# Patient Record
Sex: FEMALE | Race: WHITE | NOT HISPANIC OR LATINO | Employment: FULL TIME | URBAN - METROPOLITAN AREA
[De-identification: names, ages, dates, MRNs, and addresses within clinical notes are randomized per-mention and may not be internally consistent; named-entity substitution may affect disease eponyms.]

---

## 2017-09-05 ENCOUNTER — OFFICE VISIT (OUTPATIENT)
Dept: URGENT CARE | Facility: CLINIC | Age: 22
End: 2017-09-05
Payer: COMMERCIAL

## 2017-09-05 VITALS
RESPIRATION RATE: 16 BRPM | DIASTOLIC BLOOD PRESSURE: 76 MMHG | OXYGEN SATURATION: 98 % | SYSTOLIC BLOOD PRESSURE: 116 MMHG | HEIGHT: 62 IN | HEART RATE: 60 BPM | TEMPERATURE: 98 F | WEIGHT: 154 LBS | BODY MASS INDEX: 28.34 KG/M2

## 2017-09-05 DIAGNOSIS — J32.9 SINUSITIS, UNSPECIFIED CHRONICITY, UNSPECIFIED LOCATION: Primary | ICD-10-CM

## 2017-09-05 PROCEDURE — 96372 THER/PROPH/DIAG INJ SC/IM: CPT | Mod: S$GLB,,, | Performed by: EMERGENCY MEDICINE

## 2017-09-05 PROCEDURE — 99203 OFFICE O/P NEW LOW 30 MIN: CPT | Mod: 25,S$GLB,, | Performed by: EMERGENCY MEDICINE

## 2017-09-05 PROCEDURE — 3008F BODY MASS INDEX DOCD: CPT | Mod: S$GLB,,, | Performed by: EMERGENCY MEDICINE

## 2017-09-05 RX ORDER — BETAMETHASONE SODIUM PHOSPHATE AND BETAMETHASONE ACETATE 3; 3 MG/ML; MG/ML
9 INJECTION, SUSPENSION INTRA-ARTICULAR; INTRALESIONAL; INTRAMUSCULAR; SOFT TISSUE
Status: COMPLETED | OUTPATIENT
Start: 2017-09-05 | End: 2017-09-05

## 2017-09-05 RX ORDER — AZITHROMYCIN 500 MG/1
500 TABLET, FILM COATED ORAL DAILY
Qty: 5 TABLET | Refills: 0 | Status: SHIPPED | OUTPATIENT
Start: 2017-09-05 | End: 2017-09-05

## 2017-09-05 RX ORDER — AZITHROMYCIN 500 MG/1
500 TABLET, FILM COATED ORAL DAILY
Qty: 5 TABLET | Refills: 0 | Status: SHIPPED | OUTPATIENT
Start: 2017-09-05 | End: 2017-09-10

## 2017-09-05 RX ORDER — LISDEXAMFETAMINE DIMESYLATE 50 MG/1
50 CAPSULE ORAL EVERY MORNING
Refills: 0 | COMMUNITY
Start: 2017-06-02 | End: 2020-09-10

## 2017-09-05 RX ADMIN — BETAMETHASONE SODIUM PHOSPHATE AND BETAMETHASONE ACETATE 9 MG: 3; 3 INJECTION, SUSPENSION INTRA-ARTICULAR; INTRALESIONAL; INTRAMUSCULAR; SOFT TISSUE at 02:09

## 2017-09-05 NOTE — PROGRESS NOTES
"Subjective:       Patient ID: Noreen Olivas is a 22 y.o. female.    Vitals:  height is 5' 2" (1.575 m) and weight is 69.9 kg (154 lb). Her oral temperature is 98.3 °F (36.8 °C). Her blood pressure is 116/76 and her pulse is 60. Her respiration is 16 and oxygen saturation is 98%.     Chief Complaint: Cough (pt. started with a cough and congestion yesterday.)    Cough   This is a new problem. The current episode started in the past 7 days. The problem has been gradually worsening. The problem occurs constantly. The cough is non-productive. Associated symptoms include ear congestion, headaches, nasal congestion, postnasal drip and a sore throat. Pertinent negatives include no chest pain, chills, ear pain, eye redness, fever, myalgias, shortness of breath or wheezing. The symptoms are aggravated by lying down. There is no history of asthma or pneumonia.     Review of Systems   Constitution: Positive for malaise/fatigue. Negative for chills and fever.   HENT: Positive for congestion, postnasal drip and sore throat. Negative for ear pain and hoarse voice.    Eyes: Negative for discharge and redness.   Cardiovascular: Negative for chest pain, dyspnea on exertion and leg swelling.   Respiratory: Positive for cough. Negative for shortness of breath, sputum production and wheezing.    Musculoskeletal: Negative for myalgias.   Gastrointestinal: Negative for abdominal pain and nausea.   Neurological: Positive for headaches.       Objective:      Physical Exam   Constitutional: She is oriented to person, place, and time. She appears well-developed and well-nourished. She is cooperative.  Non-toxic appearance. She does not appear ill. No distress.   HENT:   Head: Normocephalic and atraumatic.   Right Ear: Hearing, tympanic membrane, external ear and ear canal normal.   Left Ear: Hearing, tympanic membrane, external ear and ear canal normal.   Nose: Mucosal edema present. No rhinorrhea or nasal deformity. No epistaxis. " Right sinus exhibits maxillary sinus tenderness. Right sinus exhibits no frontal sinus tenderness. Left sinus exhibits maxillary sinus tenderness. Left sinus exhibits no frontal sinus tenderness.   Mouth/Throat: Uvula is midline and mucous membranes are normal. No trismus in the jaw. Normal dentition. No uvula swelling. Posterior oropharyngeal erythema present. Tonsils are 2+ on the right. Tonsils are 2+ on the left.   Eyes: Conjunctivae and lids are normal. Right eye exhibits no discharge. Left eye exhibits no discharge. No scleral icterus.   Sclera clear bilat   Neck: Trachea normal, normal range of motion, full passive range of motion without pain and phonation normal. Neck supple.   Cardiovascular: Normal rate, regular rhythm, normal heart sounds, intact distal pulses and normal pulses.    Pulmonary/Chest: Effort normal and breath sounds normal. No respiratory distress.   Abdominal: Soft. Normal appearance. She exhibits no distension, no pulsatile midline mass and no mass. There is no tenderness.   Musculoskeletal: Normal range of motion. She exhibits no edema or deformity.   Neurological: She is alert and oriented to person, place, and time. She exhibits normal muscle tone. Coordination normal.   Skin: Skin is warm, dry and intact. She is not diaphoretic. No pallor.   Psychiatric: She has a normal mood and affect. Her speech is normal and behavior is normal. Judgment and thought content normal. Cognition and memory are normal.   Nursing note and vitals reviewed.      Assessment:       1. Sinusitis, unspecified chronicity, unspecified location        Plan:         Sinusitis, unspecified chronicity, unspecified location  -     betamethasone acetate-betamethasone sodium phosphate injection 9 mg; Inject 1.5 mLs (9 mg total) into the muscle one time.  -     azithromycin (ZITHROMAX) 500 MG tablet; Take 1 tablet (500 mg total) by mouth once daily.  Dispense: 5 tablet; Refill: 0

## 2017-09-08 ENCOUNTER — TELEPHONE (OUTPATIENT)
Dept: URGENT CARE | Facility: CLINIC | Age: 22
End: 2017-09-08

## 2019-06-20 ENCOUNTER — LAB VISIT (OUTPATIENT)
Dept: LAB | Facility: HOSPITAL | Age: 24
End: 2019-06-20
Attending: OBSTETRICS & GYNECOLOGY
Payer: COMMERCIAL

## 2019-06-20 ENCOUNTER — OFFICE VISIT (OUTPATIENT)
Dept: OBSTETRICS AND GYNECOLOGY | Facility: CLINIC | Age: 24
End: 2019-06-20
Payer: COMMERCIAL

## 2019-06-20 VITALS
WEIGHT: 144.94 LBS | SYSTOLIC BLOOD PRESSURE: 102 MMHG | DIASTOLIC BLOOD PRESSURE: 70 MMHG | BODY MASS INDEX: 26.67 KG/M2 | HEIGHT: 62 IN

## 2019-06-20 DIAGNOSIS — Z11.3 SCREENING FOR STD (SEXUALLY TRANSMITTED DISEASE): ICD-10-CM

## 2019-06-20 DIAGNOSIS — Z12.4 ENCOUNTER FOR PAPANICOLAOU SMEAR FOR CERVICAL CANCER SCREENING: ICD-10-CM

## 2019-06-20 DIAGNOSIS — Z01.419 ENCOUNTER FOR GYNECOLOGICAL EXAMINATION: Primary | ICD-10-CM

## 2019-06-20 LAB
HBV SURFACE AG SERPL QL IA: NEGATIVE
HIV 1+2 AB+HIV1 P24 AG SERPL QL IA: NEGATIVE

## 2019-06-20 PROCEDURE — 99385 PR PREVENTIVE VISIT,NEW,18-39: ICD-10-PCS | Mod: S$GLB,,, | Performed by: OBSTETRICS & GYNECOLOGY

## 2019-06-20 PROCEDURE — 86703 HIV-1/HIV-2 1 RESULT ANTBDY: CPT

## 2019-06-20 PROCEDURE — 88175 CYTOPATH C/V AUTO FLUID REDO: CPT

## 2019-06-20 PROCEDURE — 87491 CHLMYD TRACH DNA AMP PROBE: CPT

## 2019-06-20 PROCEDURE — 86592 SYPHILIS TEST NON-TREP QUAL: CPT

## 2019-06-20 PROCEDURE — 99999 PR PBB SHADOW E&M-EST. PATIENT-LVL III: CPT | Mod: PBBFAC,,, | Performed by: OBSTETRICS & GYNECOLOGY

## 2019-06-20 PROCEDURE — 87340 HEPATITIS B SURFACE AG IA: CPT

## 2019-06-20 PROCEDURE — 86696 HERPES SIMPLEX TYPE 2 TEST: CPT

## 2019-06-20 PROCEDURE — 99999 PR PBB SHADOW E&M-EST. PATIENT-LVL III: ICD-10-PCS | Mod: PBBFAC,,, | Performed by: OBSTETRICS & GYNECOLOGY

## 2019-06-20 PROCEDURE — 99385 PREV VISIT NEW AGE 18-39: CPT | Mod: S$GLB,,, | Performed by: OBSTETRICS & GYNECOLOGY

## 2019-06-20 RX ORDER — TEMAZEPAM 15 MG/1
15 CAPSULE ORAL DAILY
Refills: 2 | COMMUNITY
Start: 2019-05-21 | End: 2020-12-30

## 2019-06-20 RX ORDER — NORETHINDRONE ACETATE AND ETHINYL ESTRADIOL 1MG-20(21)
1 KIT ORAL DAILY
Qty: 84 TABLET | Refills: 3 | Status: SHIPPED | OUTPATIENT
Start: 2019-06-20 | End: 2019-10-24 | Stop reason: SDUPTHER

## 2019-06-20 NOTE — PROGRESS NOTES
Subjective:       Patient ID: Noreen Olivas is a 24 y.o. female.    Chief Complaint:  Well Woman (annual a year ago)      History of Present Illness  - here for annual. Regular periods. Sometimes has cramps. Needs something for birth control. Didn't like Nuvaring. Hasn't taken pills in years. Had difficulty remembering to take daily years ago but now feels like has a regular schedule and would be able to take at same time daily. Concerned about weight gain with ocps. Requests STD testing, including labs.  - received Gardasil.    Past Medical History:   Diagnosis Date    ADHD (attention deficit hyperactivity disorder)        History reviewed. No pertinent surgical history.      Current Outpatient Medications:     temazepam (RESTORIL) 15 mg Cap, Take 15 mg by mouth once daily., Disp: , Rfl: 2    VYVANSE 50 mg capsule, Take 50 mg by mouth every morning., Disp: , Rfl: 0    norethindrone-ethinyl estradiol (JUNEL FE 1/20) 1 mg-20 mcg (21)/75 mg (7) per tablet, Take 1 tablet by mouth once daily., Disp: 84 tablet, Rfl: 3    Review of patient's allergies indicates:  No Known Allergies    GYN & OB History  Patient's last menstrual period was 2019.   Date of Last Pap: No result found    OB History    Para Term  AB Living   0 0 0 0 0 0   SAB TAB Ectopic Multiple Live Births   0 0 0 0 0       Social History     Socioeconomic History    Marital status: Single     Spouse name: Not on file    Number of children: Not on file    Years of education: Not on file    Highest education level: Not on file   Occupational History    Not on file   Social Needs    Financial resource strain: Not on file    Food insecurity:     Worry: Not on file     Inability: Not on file    Transportation needs:     Medical: Not on file     Non-medical: Not on file   Tobacco Use    Smoking status: Never Smoker    Smokeless tobacco: Never Used   Substance and Sexual Activity    Alcohol use: Yes     Comment: socially   "   Drug use: Never    Sexual activity: Yes     Birth control/protection: None   Lifestyle    Physical activity:     Days per week: Not on file     Minutes per session: Not on file    Stress: Not on file   Relationships    Social connections:     Talks on phone: Not on file     Gets together: Not on file     Attends Buddhist service: Not on file     Active member of club or organization: Not on file     Attends meetings of clubs or organizations: Not on file     Relationship status: Not on file   Other Topics Concern    Not on file   Social History Narrative    Not on file       Family History   Problem Relation Age of Onset    Breast cancer Neg Hx     Colon cancer Neg Hx     Ovarian cancer Neg Hx        Review of Systems  Review of Systems   Respiratory: Negative for shortness of breath.    Cardiovascular: Negative for chest pain and palpitations.   Gastrointestinal: Negative for blood in stool, nausea and vomiting.   Genitourinary:        - see HPI   Skin: Negative for rash and wound.   Allergic/Immunologic: Negative for immunocompromised state.   Neurological: Negative for dizziness and syncope.   Hematological: Negative for adenopathy.   Psychiatric/Behavioral: Negative for behavioral problems.        Objective:     Vitals:    06/20/19 0840   BP: 102/70   Weight: 65.8 kg (144 lb 15.2 oz)   Height: 5' 2" (1.575 m)       Physical Exam:   Constitutional: She is oriented to person, place, and time. She appears well-developed and well-nourished.        Pulmonary/Chest: Right breast exhibits no mass, no nipple discharge, no skin change, no tenderness and no swelling. Left breast exhibits no mass, no nipple discharge, no skin change, no tenderness and no swelling. Breasts are symmetrical.        Abdominal: Soft. She exhibits no distension. There is no tenderness.     Genitourinary: Vagina normal and uterus normal. There is no tenderness or lesion on the right labia. There is no tenderness or lesion on the " left labia. Cervix is normal. Right adnexum displays no mass, no tenderness and no fullness. Left adnexum displays no mass, no tenderness and no fullness. No vaginal discharge found. Additional cervical findings: pap smear done          Musculoskeletal: Moves all extremeties.       Neurological: She is alert and oriented to person, place, and time.     Psychiatric: She has a normal mood and affect.        Assessment/ Plan:     Orders Placed This Encounter    C. trachomatis/N. gonorrhoeae by AMP DNA [facility]; Cervix    HIV 1/2 Ag/Ab (4th Gen)    RPR    Hepatitis B surface antigen    Herpes simplex type 1&2 IgG,Herpes titer    Liquid-based pap smear, screening    norethindrone-ethinyl estradiol (JUNEL FE 1/20) 1 mg-20 mcg (21)/75 mg (7) per tablet       Noreen was seen today for well woman.    Diagnoses and all orders for this visit:    Encounter for gynecological examination    Encounter for Papanicolaou smear for cervical cancer screening  -     Liquid-based pap smear, screening    Screening for STD (sexually transmitted disease)  -     C. trachomatis/N. gonorrhoeae by AMP DNA [facility]; Cervix  -     HIV 1/2 Ag/Ab (4th Gen); Future  -     RPR; Future  -     Hepatitis B surface antigen; Future  -     Herpes simplex type 1&2 IgG,Herpes titer; Future    Other orders  -     norethindrone-ethinyl estradiol (JUNEL FE 1/20) 1 mg-20 mcg (21)/75 mg (7) per tablet; Take 1 tablet by mouth once daily.    - discussed risks/benefits of low dose ocps for cycle and birth control. Reassured that with low dose ocps, significant weight gain should not be concern. Patient will let me know if she experiences adverse mood changes.  - requests HSV testing, too. Doesn't have a h/o cold sores.    Follow up in about 1 year (around 6/20/2020) for annual exam.

## 2019-06-21 LAB
C TRACH DNA SPEC QL NAA+PROBE: NOT DETECTED
HSV1 IGG SERPL QL IA: NEGATIVE
HSV2 IGG SERPL QL IA: NEGATIVE
N GONORRHOEA DNA SPEC QL NAA+PROBE: NOT DETECTED
RPR SER QL: NORMAL

## 2019-10-24 ENCOUNTER — TELEPHONE (OUTPATIENT)
Dept: OBSTETRICS AND GYNECOLOGY | Facility: CLINIC | Age: 24
End: 2019-10-24

## 2019-10-24 ENCOUNTER — OFFICE VISIT (OUTPATIENT)
Dept: OBSTETRICS AND GYNECOLOGY | Facility: CLINIC | Age: 24
End: 2019-10-24
Payer: COMMERCIAL

## 2019-10-24 ENCOUNTER — LAB VISIT (OUTPATIENT)
Dept: LAB | Facility: HOSPITAL | Age: 24
End: 2019-10-24
Attending: OBSTETRICS & GYNECOLOGY
Payer: COMMERCIAL

## 2019-10-24 VITALS
WEIGHT: 152.75 LBS | SYSTOLIC BLOOD PRESSURE: 120 MMHG | BODY MASS INDEX: 28.11 KG/M2 | HEIGHT: 62 IN | DIASTOLIC BLOOD PRESSURE: 72 MMHG

## 2019-10-24 DIAGNOSIS — Z11.3 SCREENING FOR STD (SEXUALLY TRANSMITTED DISEASE): Primary | ICD-10-CM

## 2019-10-24 DIAGNOSIS — R30.0 DYSURIA: ICD-10-CM

## 2019-10-24 DIAGNOSIS — Z11.3 SCREENING FOR STD (SEXUALLY TRANSMITTED DISEASE): ICD-10-CM

## 2019-10-24 DIAGNOSIS — N89.8 VAGINAL DISCHARGE: ICD-10-CM

## 2019-10-24 PROCEDURE — 86592 SYPHILIS TEST NON-TREP QUAL: CPT

## 2019-10-24 PROCEDURE — 87340 HEPATITIS B SURFACE AG IA: CPT

## 2019-10-24 PROCEDURE — 3008F PR BODY MASS INDEX (BMI) DOCUMENTED: ICD-10-PCS | Mod: CPTII,S$GLB,, | Performed by: OBSTETRICS & GYNECOLOGY

## 2019-10-24 PROCEDURE — 99999 PR PBB SHADOW E&M-EST. PATIENT-LVL III: CPT | Mod: PBBFAC,,, | Performed by: OBSTETRICS & GYNECOLOGY

## 2019-10-24 PROCEDURE — 87801 DETECT AGNT MULT DNA AMPLI: CPT

## 2019-10-24 PROCEDURE — 87491 CHLMYD TRACH DNA AMP PROBE: CPT | Mod: 59

## 2019-10-24 PROCEDURE — 87481 CANDIDA DNA AMP PROBE: CPT | Mod: 59

## 2019-10-24 PROCEDURE — 87088 URINE BACTERIA CULTURE: CPT

## 2019-10-24 PROCEDURE — 99213 PR OFFICE/OUTPT VISIT, EST, LEVL III, 20-29 MIN: ICD-10-PCS | Mod: S$GLB,,, | Performed by: OBSTETRICS & GYNECOLOGY

## 2019-10-24 PROCEDURE — 3008F BODY MASS INDEX DOCD: CPT | Mod: CPTII,S$GLB,, | Performed by: OBSTETRICS & GYNECOLOGY

## 2019-10-24 PROCEDURE — 86696 HERPES SIMPLEX TYPE 2 TEST: CPT

## 2019-10-24 PROCEDURE — 86703 HIV-1/HIV-2 1 RESULT ANTBDY: CPT

## 2019-10-24 PROCEDURE — 87147 CULTURE TYPE IMMUNOLOGIC: CPT

## 2019-10-24 PROCEDURE — 99213 OFFICE O/P EST LOW 20 MIN: CPT | Mod: S$GLB,,, | Performed by: OBSTETRICS & GYNECOLOGY

## 2019-10-24 PROCEDURE — 87086 URINE CULTURE/COLONY COUNT: CPT

## 2019-10-24 PROCEDURE — 99999 PR PBB SHADOW E&M-EST. PATIENT-LVL III: ICD-10-PCS | Mod: PBBFAC,,, | Performed by: OBSTETRICS & GYNECOLOGY

## 2019-10-24 RX ORDER — NITROFURANTOIN 25; 75 MG/1; MG/1
100 CAPSULE ORAL 2 TIMES DAILY
Qty: 10 CAPSULE | Refills: 0 | Status: SHIPPED | OUTPATIENT
Start: 2019-10-24 | End: 2020-03-05

## 2019-10-24 RX ORDER — NORETHINDRONE ACETATE AND ETHINYL ESTRADIOL 1MG-20(21)
1 KIT ORAL DAILY
Qty: 84 TABLET | Refills: 3 | Status: SHIPPED | OUTPATIENT
Start: 2019-10-24 | End: 2020-03-05

## 2019-10-24 NOTE — PROGRESS NOTES
Subjective:       Patient ID: Noreen Olivas is a 24 y.o. female.    Chief Complaint:  Vaginitis (vaginal  pain   dysuria wants to do std testing .)      History of Present Illness  - patient presents with UTI symptoms. First occurred a few days ago then resolved with AZO. Started again this am; took AZO today.   - c/o scant vaginal discharge.  - has not started ocps.  - has had multiple partners and unprotected sex. Requests STD labs and cultures.    Past Medical History:   Diagnosis Date    ADHD (attention deficit hyperactivity disorder)     Vaccine for human papilloma virus (HPV) types 6, 11, 16, and 18 administered , ,     3 of 3 received        History reviewed. No pertinent surgical history.      Current Outpatient Medications:     norethindrone-ethinyl estradiol (JUNEL FE 1/20) 1 mg-20 mcg (21)/75 mg (7) per tablet, Take 1 tablet by mouth once daily., Disp: 84 tablet, Rfl: 3    temazepam (RESTORIL) 15 mg Cap, Take 15 mg by mouth once daily., Disp: , Rfl: 2    VYVANSE 50 mg capsule, Take 50 mg by mouth every morning., Disp: , Rfl: 0    nitrofurantoin, macrocrystal-monohydrate, (MACROBID) 100 MG capsule, Take 1 capsule (100 mg total) by mouth 2 (two) times daily., Disp: 10 capsule, Rfl: 0    Review of patient's allergies indicates:  No Known Allergies    GYN & OB History  Patient's last menstrual period was 10/03/2019.   Date of Last Pap: 2019    OB History    Para Term  AB Living   0 0 0 0 0 0   SAB TAB Ectopic Multiple Live Births   0 0 0 0 0       Social History     Socioeconomic History    Marital status: Single     Spouse name: Not on file    Number of children: Not on file    Years of education: Not on file    Highest education level: Not on file   Occupational History    Not on file   Social Needs    Financial resource strain: Not on file    Food insecurity:     Worry: Not on file     Inability: Not on file    Transportation needs:     Medical: Not on  "file     Non-medical: Not on file   Tobacco Use    Smoking status: Never Smoker    Smokeless tobacco: Never Used   Substance and Sexual Activity    Alcohol use: Yes     Comment: socially     Drug use: Never    Sexual activity: Yes     Birth control/protection: None   Lifestyle    Physical activity:     Days per week: Not on file     Minutes per session: Not on file    Stress: Not on file   Relationships    Social connections:     Talks on phone: Not on file     Gets together: Not on file     Attends Anabaptist service: Not on file     Active member of club or organization: Not on file     Attends meetings of clubs or organizations: Not on file     Relationship status: Not on file   Other Topics Concern    Not on file   Social History Narrative    Not on file       Family History   Problem Relation Age of Onset    Breast cancer Neg Hx     Colon cancer Neg Hx     Ovarian cancer Neg Hx        Review of Systems  Review of Systems   Respiratory: Negative for shortness of breath.    Cardiovascular: Negative for chest pain and palpitations.   Gastrointestinal: Negative for blood in stool, nausea and vomiting.   Genitourinary:        - see HPI   Skin: Negative for rash and wound.   Allergic/Immunologic: Negative for immunocompromised state.   Neurological: Negative for dizziness and syncope.   Hematological: Negative for adenopathy.   Psychiatric/Behavioral: Negative for behavioral problems.        Objective:     Vitals:    10/24/19 1405   BP: 120/72   Weight: 69.3 kg (152 lb 12.5 oz)   Height: 5' 2" (1.575 m)       Physical Exam:   Constitutional: She appears well-developed and well-nourished. She is cooperative. No distress.             Abdominal: Soft. Normal appearance. There is no tenderness.     Genitourinary: Uterus normal. There is no rash, tenderness or lesion on the right labia. There is no rash, tenderness or lesion on the left labia. Cervix is normal. Right adnexum displays no mass, no tenderness and " no fullness. Left adnexum displays no mass, no tenderness and no fullness. Vaginal discharge found.   Genitourinary Comments: Scant yellow discharge in vault.               Neurological: She is alert.          Assessment/ Plan:     Orders Placed This Encounter    C. trachomatis/N. gonorrhoeae by AMP DNA    Urine culture    Vaginosis Screen by DNA Probe    RPR    HIV 1/2 Ag/Ab (4th Gen)    Hepatitis B surface antigen    Herpes simplex type 1&2 IgG,Herpes titer    norethindrone-ethinyl estradiol (JUNEL FE 1/20) 1 mg-20 mcg (21)/75 mg (7) per tablet    nitrofurantoin, macrocrystal-monohydrate, (MACROBID) 100 MG capsule       Noreen was seen today for vaginitis.    Diagnoses and all orders for this visit:    Screening for STD (sexually transmitted disease)  -     C. trachomatis/N. gonorrhoeae by AMP DNA  -     RPR; Future  -     HIV 1/2 Ag/Ab (4th Gen); Future  -     Hepatitis B surface antigen; Future  -     Herpes simplex type 1&2 IgG,Herpes titer; Future    Dysuria  -     Urine culture    Vaginal discharge  -     Vaginosis Screen by DNA Probe    Other orders  -     norethindrone-ethinyl estradiol (JUNEL FE 1/20) 1 mg-20 mcg (21)/75 mg (7) per tablet; Take 1 tablet by mouth once daily.  -     nitrofurantoin, macrocrystal-monohydrate, (MACROBID) 100 MG capsule; Take 1 capsule (100 mg total) by mouth 2 (two) times daily.    - long d/w patient re: need for safe sex. Verbalized understanding. Will start ocps today and use condoms.    Follow up for annual exam.

## 2019-10-24 NOTE — TELEPHONE ENCOUNTER
Pt states she has brown discharge, feels achy in pelvis, mild itch and swelling.  She has had multiple sexual partners so she isn't sure if its an STD or if she has BV or yeast.  Scheduled today with Mary.

## 2019-10-24 NOTE — TELEPHONE ENCOUNTER
Dr. Elias pt called saying that she doesn't know if she has a yeast infection or BV but pt says that she is experiencing brown discharge, vaginal pain, slight itching, and feels swollen. Please advise.

## 2019-10-25 LAB
BACTERIAL VAGINOSIS DNA: NEGATIVE
C TRACH DNA SPEC QL NAA+PROBE: NOT DETECTED
CANDIDA GLABRATA DNA: NEGATIVE
CANDIDA KRUSEI DNA: NEGATIVE
CANDIDA RRNA VAG QL PROBE: POSITIVE
HBV SURFACE AG SERPL QL IA: NEGATIVE
HIV 1+2 AB+HIV1 P24 AG SERPL QL IA: NEGATIVE
HSV1 IGG SERPL QL IA: NEGATIVE
HSV2 IGG SERPL QL IA: NEGATIVE
N GONORRHOEA DNA SPEC QL NAA+PROBE: NOT DETECTED
RPR SER QL: NORMAL
T VAGINALIS RRNA GENITAL QL PROBE: NEGATIVE

## 2019-10-26 LAB — BACTERIA UR CULT: ABNORMAL

## 2019-10-28 RX ORDER — FLUCONAZOLE 150 MG/1
150 TABLET ORAL ONCE
Qty: 1 TABLET | Refills: 1 | Status: SHIPPED | OUTPATIENT
Start: 2019-10-28 | End: 2019-10-28

## 2019-12-16 ENCOUNTER — OFFICE VISIT (OUTPATIENT)
Dept: URGENT CARE | Facility: CLINIC | Age: 24
End: 2019-12-16
Payer: COMMERCIAL

## 2019-12-16 VITALS
TEMPERATURE: 99 F | WEIGHT: 150 LBS | HEART RATE: 87 BPM | BODY MASS INDEX: 27.6 KG/M2 | OXYGEN SATURATION: 98 % | RESPIRATION RATE: 19 BRPM | HEIGHT: 62 IN | SYSTOLIC BLOOD PRESSURE: 115 MMHG | DIASTOLIC BLOOD PRESSURE: 83 MMHG

## 2019-12-16 DIAGNOSIS — R50.9 FEVER, UNSPECIFIED FEVER CAUSE: ICD-10-CM

## 2019-12-16 DIAGNOSIS — B34.9 VIRAL SYNDROME: Primary | ICD-10-CM

## 2019-12-16 LAB
CTP QC/QA: YES
FLUAV AG NPH QL: NEGATIVE
FLUBV AG NPH QL: NEGATIVE

## 2019-12-16 PROCEDURE — 99214 OFFICE O/P EST MOD 30 MIN: CPT | Mod: 25,S$GLB,, | Performed by: PHYSICIAN ASSISTANT

## 2019-12-16 PROCEDURE — 87804 INFLUENZA ASSAY W/OPTIC: CPT | Mod: QW,S$GLB,, | Performed by: PHYSICIAN ASSISTANT

## 2019-12-16 PROCEDURE — 96372 PR INJECTION,THERAP/PROPH/DIAG2ST, IM OR SUBCUT: ICD-10-PCS | Mod: S$GLB,,, | Performed by: PHYSICIAN ASSISTANT

## 2019-12-16 PROCEDURE — 99214 PR OFFICE/OUTPT VISIT, EST, LEVL IV, 30-39 MIN: ICD-10-PCS | Mod: 25,S$GLB,, | Performed by: PHYSICIAN ASSISTANT

## 2019-12-16 PROCEDURE — 96372 THER/PROPH/DIAG INJ SC/IM: CPT | Mod: S$GLB,,, | Performed by: PHYSICIAN ASSISTANT

## 2019-12-16 PROCEDURE — 87804 POCT INFLUENZA A/B: ICD-10-PCS | Mod: 59,QW,S$GLB, | Performed by: PHYSICIAN ASSISTANT

## 2019-12-16 RX ORDER — BETAMETHASONE SODIUM PHOSPHATE AND BETAMETHASONE ACETATE 3; 3 MG/ML; MG/ML
9 INJECTION, SUSPENSION INTRA-ARTICULAR; INTRALESIONAL; INTRAMUSCULAR; SOFT TISSUE
Status: COMPLETED | OUTPATIENT
Start: 2019-12-16 | End: 2019-12-16

## 2019-12-16 RX ORDER — PROMETHAZINE HYDROCHLORIDE AND DEXTROMETHORPHAN HYDROBROMIDE 6.25; 15 MG/5ML; MG/5ML
5 SYRUP ORAL EVERY 6 HOURS PRN
Qty: 100 ML | Refills: 0 | Status: SHIPPED | OUTPATIENT
Start: 2019-12-16 | End: 2020-03-05

## 2019-12-16 RX ADMIN — BETAMETHASONE SODIUM PHOSPHATE AND BETAMETHASONE ACETATE 9 MG: 3; 3 INJECTION, SUSPENSION INTRA-ARTICULAR; INTRALESIONAL; INTRAMUSCULAR; SOFT TISSUE at 12:12

## 2019-12-16 NOTE — PROGRESS NOTES
"Subjective:       Patient ID: Noreen Olivas is a 24 y.o. female.    Vitals:  height is 5' 2" (1.575 m) and weight is 68 kg (150 lb). Her oral temperature is 99 °F (37.2 °C). Her blood pressure is 115/83 and her pulse is 87. Her respiration is 19 and oxygen saturation is 98%.     Chief Complaint: URI    Pt reports fevers/chills, body aches, intermittent cough for 3 days.  She has been taking DayQuil NyQuil and Tylenol with only mild relief.  She denies any sore throat, sinus congestion, abdominal pain, nausea/vomiting, wheezing, shortness of breath.    URI    This is a new problem. The current episode started in the past 7 days (Friday). The maximum temperature recorded prior to her arrival was 102 - 102.9 F. The fever has been present for 3 to 4 days. Associated symptoms include congestion, coughing and neck pain. Pertinent negatives include no abdominal pain, chest pain, diarrhea, dysuria, ear pain, headaches, joint pain, joint swelling, nausea, plugged ear sensation, rash, rhinorrhea, sinus pain, sneezing, sore throat, swollen glands, vomiting or wheezing. Treatments tried: Dayquil, nyquil. The treatment provided mild relief.       Constitution: Positive for fever. Negative for chills, sweating and fatigue.   HENT: Positive for congestion. Negative for ear pain, sinus pain, sinus pressure, sore throat and voice change.    Neck: Positive for neck pain. Negative for painful lymph nodes.   Cardiovascular: Negative for chest pain.   Eyes: Negative for eye redness.   Respiratory: Positive for cough. Negative for chest tightness, sputum production, bloody sputum, COPD, shortness of breath, stridor, wheezing and asthma.    Gastrointestinal: Negative for abdominal pain, nausea, vomiting and diarrhea.   Genitourinary: Negative for dysuria.   Musculoskeletal: Positive for muscle ache.   Skin: Negative for rash.   Allergic/Immunologic: Negative for seasonal allergies, asthma and sneezing.   Neurological: Negative for " headaches.   Hematologic/Lymphatic: Negative for swollen lymph nodes.       Objective:      Physical Exam   Constitutional: She is oriented to person, place, and time. She appears well-developed and well-nourished. She is cooperative.  Non-toxic appearance. She does not have a sickly appearance. She appears ill. No distress.   HENT:   Head: Normocephalic and atraumatic.   Right Ear: Hearing, tympanic membrane, external ear and ear canal normal.   Left Ear: Hearing, tympanic membrane, external ear and ear canal normal.   Nose: Nose normal. No mucosal edema, rhinorrhea or nasal deformity. No epistaxis. Right sinus exhibits no maxillary sinus tenderness and no frontal sinus tenderness. Left sinus exhibits no maxillary sinus tenderness and no frontal sinus tenderness.   Mouth/Throat: Uvula is midline, oropharynx is clear and moist and mucous membranes are normal. No trismus in the jaw. Normal dentition. No uvula swelling. No oropharyngeal exudate, posterior oropharyngeal edema or posterior oropharyngeal erythema.   Eyes: Conjunctivae and lids are normal. No scleral icterus.   Neck: Trachea normal, full passive range of motion without pain and phonation normal. Neck supple. No neck rigidity. No edema and no erythema present.   Cardiovascular: Normal rate, regular rhythm, normal heart sounds, intact distal pulses and normal pulses.   Pulmonary/Chest: Effort normal and breath sounds normal. No respiratory distress. She has no decreased breath sounds. She has no rhonchi.   Abdominal: Normal appearance.   Musculoskeletal: Normal range of motion. She exhibits no edema or deformity.   Neurological: She is alert and oriented to person, place, and time. She exhibits normal muscle tone. Coordination normal.   Skin: Skin is warm, dry, intact, not diaphoretic and not pale.   Psychiatric: She has a normal mood and affect. Her speech is normal and behavior is normal. Judgment and thought content normal. Cognition and memory are  "normal.   Nursing note and vitals reviewed.    Results for orders placed or performed in visit on 12/16/19   POCT Influenza A/B   Result Value Ref Range    Rapid Influenza A Ag Negative Negative    Rapid Influenza B Ag Negative Negative     Acceptable Yes            Assessment:       1. Viral syndrome    2. Fever, unspecified fever cause        Plan:         Viral syndrome  -     betamethasone acetate-betamethasone sodium phosphate injection 9 mg  -     promethazine-dextromethorphan (PROMETHAZINE-DM) 6.25-15 mg/5 mL Syrp; Take 5 mLs by mouth every 6 (six) hours as needed (cough).  Dispense: 100 mL; Refill: 0    Fever, unspecified fever cause  -     POCT Influenza A/B      Patient Instructions     Viral Syndrome (Adult)  A viral illness may cause a number of symptoms. The symptoms depend on the part of the body that the virus affects. If it settles in your nose, throat, and lungs, it may cause cough, sore throat, congestion, and sometimes headache. If it settles in your stomach and intestinal tract, it may cause vomiting and diarrhea. Sometimes it causes vague symptoms like "aching all over," feeling tired, loss of appetite, or fever.  A viral illness usually lasts 1 to 2 weeks, but sometimes it lasts longer. In some cases, a more serious infection can look like a viral syndrome in the first few days of the illness. You may need another exam and additional tests to know the difference. Watch for the warning signs listed below.  Home care  Follow these guidelines for taking care of yourself at home:  · If symptoms are severe, rest at home for the first 2 to 3 days.  · Stay away from cigarette smoke - both your smoke and the smoke from others.  · You may use over-the-counter acetaminophen or ibuprofen for fever, muscle aching, and headache, unless another medicine was prescribed for this. If you have chronic liver or kidney disease or ever had a stomach ulcer or GI bleeding, talk with your doctor before " using these medicines. No one who is younger than 18 and ill with a fever should take aspirin. It may cause severe disease or death.  · Your appetite may be poor, so a light diet is fine. Avoid dehydration by drinking 8 to 12 8-ounce glasses of fluids each day. This may include water; orange juice; lemonade; apple, grape, and cranberry juice; clear fruit drinks; electrolyte replacement and sports drinks; and decaffeinated teas and coffee. If you have been diagnosed with a kidney disease, ask your doctor how much and what types of fluids you should drink to prevent dehydration. If you have kidney disease, drinking too much fluid can cause it build up in the your body and be dangerous to your health.  · Over-the-counter remedies won't shorten the length of the illness but may be helpful for cough, sore throat; and nasal and sinus congestion. Don't use decongestants if you have high blood pressure.  Follow-up care  Follow up with your healthcare provider if you do not improve over the next week.  Call 911  Get emergency medical care if any of the following occur:  · Convulsion  · Feeling weak, dizzy, or like you are going to faint  · Chest pain, shortness of breath, wheezing, or difficulty breathing  When to seek medical advice  Call your healthcare provider right away if any of these occur:  · Cough with lots of colored sputum (mucus) or blood in your sputum  · Chest pain, shortness of breath, wheezing, or difficulty breathing  · Severe headache; face, neck, or ear pain  · Severe, constant pain in the lower right side of your belly (abdominal)  · Continued vomiting (cant keep liquids down)  · Frequent diarrhea (more than 5 times a day); blood (red or black color) or mucus in diarrhea  · Feeling weak, dizzy, or like you are going to faint  · Extreme thirst  · Fever of 100.4°F (38°C) or higher, or as directed by your healthcare provider  Date Last Reviewed: 9/25/2015  © 1690-5989 [employer]. [address] [place] [address]  [place] [address]. All rights reserved. This information is not intended as a substitute for professional medical care. Always follow your healthcare professional's instructions.      You received a steroid shot today. This can elevate your blood pressure, elevate your blood sugar, cause water weight gain, nervous energy, redness to the face and dimpling of the skin where the shot goes in. Please contact your doctor if you have any of these side effects.     Please follow up with your Primary care provider within 2-5 days if your signs and symptoms have not resolved or worsen.     If your condition worsens or fails to improve we recommend that you receive another evaluation at the emergency room immediately or contact your primary medical clinic to discuss your concerns.   You must understand that you have received an Urgent Care treatment only and that you may be released before all of your medical problems are known or treated. You, the patient, will arrange for follow up care as instructed.     RED FLAGS/WARNING SYMPTOMS DISCUSSED WITH PATIENT THAT WOULD WARRANT EMERGENT MEDICAL ATTENTION. PATIENT VERBALIZED UNDERSTANDING.

## 2019-12-16 NOTE — PATIENT INSTRUCTIONS
"  Viral Syndrome (Adult)  A viral illness may cause a number of symptoms. The symptoms depend on the part of the body that the virus affects. If it settles in your nose, throat, and lungs, it may cause cough, sore throat, congestion, and sometimes headache. If it settles in your stomach and intestinal tract, it may cause vomiting and diarrhea. Sometimes it causes vague symptoms like "aching all over," feeling tired, loss of appetite, or fever.  A viral illness usually lasts 1 to 2 weeks, but sometimes it lasts longer. In some cases, a more serious infection can look like a viral syndrome in the first few days of the illness. You may need another exam and additional tests to know the difference. Watch for the warning signs listed below.  Home care  Follow these guidelines for taking care of yourself at home:  · If symptoms are severe, rest at home for the first 2 to 3 days.  · Stay away from cigarette smoke - both your smoke and the smoke from others.  · You may use over-the-counter acetaminophen or ibuprofen for fever, muscle aching, and headache, unless another medicine was prescribed for this. If you have chronic liver or kidney disease or ever had a stomach ulcer or GI bleeding, talk with your doctor before using these medicines. No one who is younger than 18 and ill with a fever should take aspirin. It may cause severe disease or death.  · Your appetite may be poor, so a light diet is fine. Avoid dehydration by drinking 8 to 12 8-ounce glasses of fluids each day. This may include water; orange juice; lemonade; apple, grape, and cranberry juice; clear fruit drinks; electrolyte replacement and sports drinks; and decaffeinated teas and coffee. If you have been diagnosed with a kidney disease, ask your doctor how much and what types of fluids you should drink to prevent dehydration. If you have kidney disease, drinking too much fluid can cause it build up in the your body and be dangerous to your " health.  · Over-the-counter remedies won't shorten the length of the illness but may be helpful for cough, sore throat; and nasal and sinus congestion. Don't use decongestants if you have high blood pressure.  Follow-up care  Follow up with your healthcare provider if you do not improve over the next week.  Call 911  Get emergency medical care if any of the following occur:  · Convulsion  · Feeling weak, dizzy, or like you are going to faint  · Chest pain, shortness of breath, wheezing, or difficulty breathing  When to seek medical advice  Call your healthcare provider right away if any of these occur:  · Cough with lots of colored sputum (mucus) or blood in your sputum  · Chest pain, shortness of breath, wheezing, or difficulty breathing  · Severe headache; face, neck, or ear pain  · Severe, constant pain in the lower right side of your belly (abdominal)  · Continued vomiting (cant keep liquids down)  · Frequent diarrhea (more than 5 times a day); blood (red or black color) or mucus in diarrhea  · Feeling weak, dizzy, or like you are going to faint  · Extreme thirst  · Fever of 100.4°F (38°C) or higher, or as directed by your healthcare provider  Date Last Reviewed: 9/25/2015  © 1945-3179 [employer]. [address] [place] [address], [place] [address]. All rights reserved. This information is not intended as a substitute for professional medical care. Always follow your healthcare professional's instructions.      You received a steroid shot today. This can elevate your blood pressure, elevate your blood sugar, cause water weight gain, nervous energy, redness to the face and dimpling of the skin where the shot goes in. Please contact your doctor if you have any of these side effects.     Please follow up with your Primary care provider within 2-5 days if your signs and symptoms have not resolved or worsen.     If your condition worsens or fails to improve we recommend that you receive another evaluation at  the emergency room immediately or contact your primary medical clinic to discuss your concerns.   You must understand that you have received an Urgent Care treatment only and that you may be released before all of your medical problems are known or treated. You, the patient, will arrange for follow up care as instructed.     RED FLAGS/WARNING SYMPTOMS DISCUSSED WITH PATIENT THAT WOULD WARRANT EMERGENT MEDICAL ATTENTION. PATIENT VERBALIZED UNDERSTANDING.

## 2019-12-16 NOTE — LETTER
December 16, 2019      [facility]  [address]  [place] [address]  Phone: [phone]  Fax: [phone]       Patient: Noreen Olivas   YOB: 1995  Date of Visit: 12/16/2019    To Whom It May Concern:    Jenna Olivas  was at [facility] on 12/16/2019. She may return to work/school on 12/18/19 with no restrictions. If you have any questions or concerns, or if I can be of further assistance, please do not hesitate to contact me.    Sincerely,    Maricarmen Cruz PA-C

## 2020-03-02 ENCOUNTER — TELEPHONE (OUTPATIENT)
Dept: OBSTETRICS AND GYNECOLOGY | Facility: CLINIC | Age: 25
End: 2020-03-02

## 2020-03-02 NOTE — TELEPHONE ENCOUNTER
Pt states that she recently sexually active and would like STD testing. She also c/o slight pain with urination. She also mentioned that she has been having colorectal issues - always has cramps after running, abnormal bowel movements and white patches in BM. Scheduled appt with pt with NP for STD testing and Urine check. Advised pt to keep her appointment with PCP to discuss BM. Pt verbalized understanding.

## 2020-03-02 NOTE — TELEPHONE ENCOUNTER
Dr. Elias pt called saying that she is having some rectal problems and would like to be std checked. Please advise. Thank you.

## 2020-03-05 ENCOUNTER — LAB VISIT (OUTPATIENT)
Dept: LAB | Facility: HOSPITAL | Age: 25
End: 2020-03-05
Attending: NURSE PRACTITIONER
Payer: COMMERCIAL

## 2020-03-05 ENCOUNTER — OFFICE VISIT (OUTPATIENT)
Dept: OBSTETRICS AND GYNECOLOGY | Facility: CLINIC | Age: 25
End: 2020-03-05
Payer: COMMERCIAL

## 2020-03-05 VITALS
WEIGHT: 151.13 LBS | DIASTOLIC BLOOD PRESSURE: 72 MMHG | BODY MASS INDEX: 27.81 KG/M2 | HEIGHT: 62 IN | SYSTOLIC BLOOD PRESSURE: 118 MMHG

## 2020-03-05 DIAGNOSIS — Z30.011 ENCOUNTER FOR INITIAL PRESCRIPTION OF CONTRACEPTIVE PILLS: ICD-10-CM

## 2020-03-05 DIAGNOSIS — N76.0 BV (BACTERIAL VAGINOSIS): ICD-10-CM

## 2020-03-05 DIAGNOSIS — Z11.3 SCREENING EXAMINATION FOR STD (SEXUALLY TRANSMITTED DISEASE): ICD-10-CM

## 2020-03-05 DIAGNOSIS — N89.8 VAGINAL DISCHARGE: ICD-10-CM

## 2020-03-05 DIAGNOSIS — Z32.02 URINE PREGNANCY TEST NEGATIVE: ICD-10-CM

## 2020-03-05 DIAGNOSIS — R30.0 DYSURIA: ICD-10-CM

## 2020-03-05 DIAGNOSIS — B96.89 BV (BACTERIAL VAGINOSIS): ICD-10-CM

## 2020-03-05 DIAGNOSIS — Z11.3 SCREENING EXAMINATION FOR STD (SEXUALLY TRANSMITTED DISEASE): Primary | ICD-10-CM

## 2020-03-05 LAB
B-HCG UR QL: NEGATIVE
BACTERIA HYPHAE, POC: POSITIVE
BILIRUB SERPL-MCNC: NORMAL MG/DL
BLOOD URINE, POC: NORMAL
COLOR, POC UA: YELLOW
CTP QC/QA: YES
GARDNERELLA VAGINALIS: NEGATIVE
GLUCOSE UR QL STRIP: NORMAL
KETONES UR QL STRIP: NORMAL
LEUKOCYTE ESTERASE URINE, POC: NORMAL
NITRITE, POC UA: NORMAL
OTHER MICROSC. OBSERVATIONS: ABNORMAL
PH, POC UA: 5
POC BACTERIAL VAGINOSIS: POSITIVE
POC CLUE CELLS: NEGATIVE
PROTEIN, POC: NORMAL
SPECIFIC GRAVITY, POC UA: 1.02
TRICHOMONAS, POC: NEGATIVE
UROBILINOGEN, POC UA: NORMAL
YEAST WET PREP: NEGATIVE
YEAST, POC: NEGATIVE

## 2020-03-05 PROCEDURE — 99214 OFFICE O/P EST MOD 30 MIN: CPT | Mod: 25,S$GLB,, | Performed by: NURSE PRACTITIONER

## 2020-03-05 PROCEDURE — 87210 POCT KOH: ICD-10-PCS | Mod: QW,S$GLB,, | Performed by: NURSE PRACTITIONER

## 2020-03-05 PROCEDURE — 99999 PR PBB SHADOW E&M-EST. PATIENT-LVL III: CPT | Mod: PBBFAC,,, | Performed by: NURSE PRACTITIONER

## 2020-03-05 PROCEDURE — 86703 HIV-1/HIV-2 1 RESULT ANTBDY: CPT

## 2020-03-05 PROCEDURE — 87340 HEPATITIS B SURFACE AG IA: CPT

## 2020-03-05 PROCEDURE — 86592 SYPHILIS TEST NON-TREP QUAL: CPT

## 2020-03-05 PROCEDURE — 99999 PR PBB SHADOW E&M-EST. PATIENT-LVL III: ICD-10-PCS | Mod: PBBFAC,,, | Performed by: NURSE PRACTITIONER

## 2020-03-05 PROCEDURE — 81002 POCT URINE DIPSTICK WITHOUT MICROSCOPE: ICD-10-PCS | Mod: S$GLB,,, | Performed by: NURSE PRACTITIONER

## 2020-03-05 PROCEDURE — 99214 PR OFFICE/OUTPT VISIT, EST, LEVL IV, 30-39 MIN: ICD-10-PCS | Mod: 25,S$GLB,, | Performed by: NURSE PRACTITIONER

## 2020-03-05 PROCEDURE — 81002 URINALYSIS NONAUTO W/O SCOPE: CPT | Mod: S$GLB,,, | Performed by: NURSE PRACTITIONER

## 2020-03-05 PROCEDURE — 81025 URINE PREGNANCY TEST: CPT | Mod: S$GLB,,, | Performed by: NURSE PRACTITIONER

## 2020-03-05 PROCEDURE — 87086 URINE CULTURE/COLONY COUNT: CPT

## 2020-03-05 PROCEDURE — 87491 CHLMYD TRACH DNA AMP PROBE: CPT

## 2020-03-05 PROCEDURE — 87210 SMEAR WET MOUNT SALINE/INK: CPT | Mod: QW,S$GLB,, | Performed by: NURSE PRACTITIONER

## 2020-03-05 PROCEDURE — 81025 POCT URINE PREGNANCY: ICD-10-PCS | Mod: S$GLB,,, | Performed by: NURSE PRACTITIONER

## 2020-03-05 RX ORDER — DROSPIRENONE 4 MG/1
4 TABLET, FILM COATED ORAL DAILY
Qty: 84 TABLET | Refills: 1 | Status: SHIPPED | OUTPATIENT
Start: 2020-03-05 | End: 2020-04-03

## 2020-03-05 RX ORDER — METRONIDAZOLE 65 MG/5G
1 GEL TOPICAL NIGHTLY
Qty: 5 G | Refills: 0 | Status: SHIPPED | OUTPATIENT
Start: 2020-03-05 | End: 2020-03-06

## 2020-03-06 LAB
BACTERIA UR CULT: NO GROWTH
C TRACH DNA SPEC QL NAA+PROBE: DETECTED
HBV SURFACE AG SERPL QL IA: NEGATIVE
HIV 1+2 AB+HIV1 P24 AG SERPL QL IA: NEGATIVE
N GONORRHOEA DNA SPEC QL NAA+PROBE: NOT DETECTED

## 2020-03-07 LAB — RPR SER QL: NORMAL

## 2020-03-08 ENCOUNTER — PATIENT MESSAGE (OUTPATIENT)
Dept: OBSTETRICS AND GYNECOLOGY | Facility: CLINIC | Age: 25
End: 2020-03-08

## 2020-03-08 DIAGNOSIS — A74.9 CHLAMYDIA INFECTION: Primary | ICD-10-CM

## 2020-03-08 RX ORDER — AZITHROMYCIN 500 MG/1
1000 TABLET, FILM COATED ORAL DAILY
Qty: 2 TABLET | Refills: 0 | Status: SHIPPED | OUTPATIENT
Start: 2020-03-08 | End: 2020-03-09

## 2020-03-08 NOTE — PROGRESS NOTES
Reason for Visit today:   STD testing      (Dr. Elias patient)    Last Pap:  2019    Gardasil:  Completed 3 of 3    HPI:     Noreen Olivas is a 24 y.o. female who presents today for STD testing, and also complains of dysuria.  Patient states this started a few weeks ago before her most recent menstrual cycle on .  She also complains of a white vaginal discharge feeling bloated, vaginal vulvar itching or, in feels swollen inside vaginally.  Patient is sexually active, and reports having had 2-3 different partners since her visit four and a half months ago.  She has no breast or urinary symptoms.  She is not taking oral contraceptive pills as prescribed at her last visit.  Patient also reports that she does not use condoms regularly either.  She states that in the past she has had trouble remembering to take her oral contraceptive pills daily and is concerned about waking.  She has also tried NuvaRing but did not like that either.  She does not desire pregnancy at this time.    Discussed with patient at length the importance of using condoms to prevent sexually transmitted infections, as well as the importance of contraception to prevent undesired pregnancy.  She would like to discuss options today.    GYN & OB History:     LMP:  Patient's last menstrual period was 2020 (approximate).   She is currently using no method for contraception.    OB History    Para Term  AB Living   0 0 0 0 0 0   SAB TAB Ectopic Multiple Live Births   0 0 0 0 0       Past Medical History:   Diagnosis Date    ADHD (attention deficit hyperactivity disorder)     Vaccine for human papilloma virus (HPV) types 6, 11, 16, and 18 administered , ,     3 of 3 received      History reviewed. No pertinent surgical history.  Family History   Problem Relation Age of Onset    Breast cancer Neg Hx     Colon cancer Neg Hx     Ovarian cancer Neg Hx      Social History     Tobacco Use     "Smoking status: Never Smoker    Smokeless tobacco: Never Used   Substance Use Topics    Alcohol use: Yes     Comment: socially     Drug use: Never       Current Outpatient Medications:     temazepam (RESTORIL) 15 mg Cap, Take 15 mg by mouth once daily., Disp: , Rfl: 2    VYVANSE 50 mg capsule, Take 50 mg by mouth every morning., Disp: , Rfl: 0    SLYND 4 mg (28) Tab, Take 4 mg by mouth once daily. BIN# [id number], PCN# [id number], GRP# [id number], ID# [id number], Disp: 84 tablet, Rfl: 1    Review of patient's allergies indicates:  No Known Allergies    ROS:     GENERAL:  Denies fever, chills, fatigability or weight loss/gain.  REPRODUCTIVE:  See HPI.  ABDOMEN:  Denies abdominal pain, nausea, vomiting, diarrhea, constipation, or change in appetite.  URINARY:  See HPI.  Denies incontinence, nocturia, frequency, or hematuria.  CARDIOVASCULAR:  No chest pain. No shortness of breath. No leg cramps.  NEUROLOGICAL:  No headaches. No vision changes.    Physical Exam:     Vitals:    03/05/20 1056   BP: 118/72   Weight: 68.6 kg (151 lb 2 oz)   Height: 5' 2" (1.575 m)   PainSc: 0-No pain     Body mass index is 27.64 kg/m².    GENERAL: No acute distress, alert and engaged  VULVA: normal appearing vulva with no masses, tenderness or lesions.   VAGINA: normal appearing vagina with normal color, and no lesions or ulcers; sm/mod amt white, malodorous discharge - KOH/WetPrep collected.   CERVIX: normal appearing cervix without discharge or lesions; no CMT.   UTERUS: uterus is normal size, shape, consistency and non-tender; mobile.   ADNEXA: normal adnexa in size, non-tender and no masses palpated.    Results:      Urine dipstick shows:   negative for all components.    UPT:  NEG     KOH/Wet Prep results:  BV:   POS,  Candida:  neg,  Trichomonas:   neg       (See full results under LABS in Epic)    Assessment/ Plan:   Screening examination for STD (sexually transmitted disease)  -     C. trachomatis/N. gonorrhoeae by AMP DNA [facility]; Urine  -     " HIV 1/2 Ag/Ab (4th Gen); Future; Expected date: 03/05/2020  -     RPR; Future; Expected date: 03/05/2020  -     Hepatitis B Surface Antigen; Future; Expected date: 03/05/2020    Dysuria  -     POCT URINE DIPSTICK WITHOUT MICROSCOPE  -     Urine culture    Vaginal discharge  -     POCT Wet Prep  -     POCT KOH    Urine pregnancy test negative  -     POCT urine pregnancy    BV (bacterial vaginosis)  -     NUVESSA 1.3 % Gel; Place 1 Applicatorful vaginally nightly. BIN#: [id number],  PCN#: [id number],  GRP#: [id number], ID#: [id number] for 1 dose  Dispense: 5 g; Refill: 0    Encounter for initial prescription of contraceptive pills  -     SLYND 4 mg (28) Tab; Take 4 mg by mouth once daily. BIN# [id number], PCN# [id number], GRP# [id number], ID# [id number]  Dispense: 84 tablet; Refill: 1      D/W patient the risks of, benefits of, and alternatives of various forms of contraception were discussed at this visit.  After a discussion of the R/B/A of fertility awareness, barrier contraception, hormonal pills, injections, patches, rings, hormonal and non-hormonal IUDs, and the subdermal implant, all of her questions were answered, and she has opted for:  Progesterone-Only Pills.  Discussed ACHES.    Counseling:     * Condoms for STD protection were discussed, as was Plan B in the event of unprotected intercourse.   * Recommendations for STD screening based on patient's age and sexual habits were reviewed.  * RTC in 3 months for annual.    20 minutes of face to face counseling occurred during today's visit.     * Use of the [employer] Patient Portal discussed and encouraged during today's visit.   * Patient aware she will be notified of any results from today's visit once they have been reviewed by Provider, either via her    [facility] patient portal, or telephone call.    Follow-up:  Follow up in 16 weeks (on 6/25/2020) for Annual.

## 2020-03-09 ENCOUNTER — PATIENT MESSAGE (OUTPATIENT)
Dept: OBSTETRICS AND GYNECOLOGY | Facility: CLINIC | Age: 25
End: 2020-03-09

## 2020-03-11 ENCOUNTER — PATIENT MESSAGE (OUTPATIENT)
Dept: OBSTETRICS AND GYNECOLOGY | Facility: CLINIC | Age: 25
End: 2020-03-11

## 2020-03-13 ENCOUNTER — PATIENT MESSAGE (OUTPATIENT)
Dept: OBSTETRICS AND GYNECOLOGY | Facility: CLINIC | Age: 25
End: 2020-03-13

## 2020-04-03 ENCOUNTER — OFFICE VISIT (OUTPATIENT)
Dept: OBSTETRICS AND GYNECOLOGY | Facility: CLINIC | Age: 25
End: 2020-04-03
Payer: COMMERCIAL

## 2020-04-03 VITALS
HEIGHT: 62 IN | BODY MASS INDEX: 27.47 KG/M2 | WEIGHT: 149.25 LBS | SYSTOLIC BLOOD PRESSURE: 114 MMHG | DIASTOLIC BLOOD PRESSURE: 68 MMHG

## 2020-04-03 DIAGNOSIS — Z86.19 HISTORY OF CHLAMYDIA: Primary | ICD-10-CM

## 2020-04-03 PROCEDURE — 99213 PR OFFICE/OUTPT VISIT, EST, LEVL III, 20-29 MIN: ICD-10-PCS | Mod: S$GLB,,, | Performed by: OBSTETRICS & GYNECOLOGY

## 2020-04-03 PROCEDURE — 99999 PR PBB SHADOW E&M-EST. PATIENT-LVL II: ICD-10-PCS | Mod: PBBFAC,,, | Performed by: OBSTETRICS & GYNECOLOGY

## 2020-04-03 PROCEDURE — 87491 CHLMYD TRACH DNA AMP PROBE: CPT

## 2020-04-03 PROCEDURE — 99999 PR PBB SHADOW E&M-EST. PATIENT-LVL II: CPT | Mod: PBBFAC,,, | Performed by: OBSTETRICS & GYNECOLOGY

## 2020-04-03 PROCEDURE — 99213 OFFICE O/P EST LOW 20 MIN: CPT | Mod: S$GLB,,, | Performed by: OBSTETRICS & GYNECOLOGY

## 2020-04-03 NOTE — PROGRESS NOTES
Subjective:       Patient ID: Noreen Olivas is a 24 y.o. female.    Chief Complaint:  Follow-up (IQRA )      History of Present Illness  - patient presents for iqra with chlamydia. Denies complaints. Not currently sexually active. STD labs were negative.    Past Medical History:   Diagnosis Date    ADHD (attention deficit hyperactivity disorder)     Vaccine for human papilloma virus (HPV) types 6, 11, 16, and 18 administered , ,     3 of 3 received        History reviewed. No pertinent surgical history.      Current Outpatient Medications:     temazepam (RESTORIL) 15 mg Cap, Take 15 mg by mouth once daily., Disp: , Rfl: 2    VYVANSE 50 mg capsule, Take 50 mg by mouth every morning., Disp: , Rfl: 0    Review of patient's allergies indicates:  No Known Allergies    GYN & OB History  Patient's last menstrual period was 2020.   Date of Last Pap: 2019    OB History    Para Term  AB Living   0 0 0 0 0 0   SAB TAB Ectopic Multiple Live Births   0 0 0 0 0       Social History     Socioeconomic History    Marital status: Single     Spouse name: Not on file    Number of children: Not on file    Years of education: Not on file    Highest education level: Not on file   Occupational History    Not on file   Social Needs    Financial resource strain: Not on file    Food insecurity:     Worry: Not on file     Inability: Not on file    Transportation needs:     Medical: Not on file     Non-medical: Not on file   Tobacco Use    Smoking status: Never Smoker    Smokeless tobacco: Never Used   Substance and Sexual Activity    Alcohol use: Yes     Comment: socially     Drug use: Never    Sexual activity: Not Currently     Birth control/protection: None   Lifestyle    Physical activity:     Days per week: Not on file     Minutes per session: Not on file    Stress: Not on file   Relationships    Social connections:     Talks on phone: Not on file     Gets together: Not on file  "    Attends Holiness service: Not on file     Active member of club or organization: Not on file     Attends meetings of clubs or organizations: Not on file     Relationship status: Not on file   Other Topics Concern    Not on file   Social History Narrative    Not on file       Family History   Problem Relation Age of Onset    Breast cancer Neg Hx     Colon cancer Neg Hx     Ovarian cancer Neg Hx        Review of Systems  Review of Systems   All other systems reviewed and are negative.       Objective:     Vitals:    04/03/20 1220   BP: 114/68   Weight: 67.7 kg (149 lb 4 oz)   Height: 5' 2" (1.575 m)       Physical Exam:   Constitutional: She appears well-developed and well-nourished. She is cooperative. No distress.             Abdominal: Soft. Normal appearance. There is no tenderness.     Genitourinary: Vagina normal and uterus normal. There is no rash, tenderness or lesion on the right labia. There is no rash, tenderness or lesion on the left labia. Cervix is normal. Right adnexum displays no mass, no tenderness and no fullness. Left adnexum displays no mass, no tenderness and no fullness.               Neurological: She is alert.          Assessment/ Plan:     Orders Placed This Encounter    C. trachomatis/N. gonorrhoeae by AMP DNA       Noreen was seen today for follow-up.    Diagnoses and all orders for this visit:    History of chlamydia  -     C. trachomatis/N. gonorrhoeae by AMP DNA        Follow up for annual exam.  "

## 2020-04-08 LAB
C TRACH DNA SPEC QL NAA+PROBE: NOT DETECTED
N GONORRHOEA DNA SPEC QL NAA+PROBE: NOT DETECTED

## 2020-05-14 ENCOUNTER — TELEPHONE (OUTPATIENT)
Dept: OBSTETRICS AND GYNECOLOGY | Facility: CLINIC | Age: 25
End: 2020-05-14

## 2020-05-14 NOTE — TELEPHONE ENCOUNTER
Pt c/o yellowish/bloody discharge.  She has a h/o chlamydia.  Requesting STD testing.      Scheduled Tuesday.

## 2020-05-19 ENCOUNTER — OFFICE VISIT (OUTPATIENT)
Dept: OBSTETRICS AND GYNECOLOGY | Facility: CLINIC | Age: 25
End: 2020-05-19
Payer: COMMERCIAL

## 2020-05-19 VITALS — HEIGHT: 62 IN | TEMPERATURE: 98 F | WEIGHT: 154.31 LBS | BODY MASS INDEX: 28.39 KG/M2

## 2020-05-19 DIAGNOSIS — Z86.19 HISTORY OF CHLAMYDIA INFECTION: ICD-10-CM

## 2020-05-19 DIAGNOSIS — N89.8 VAGINAL DISCHARGE: Primary | ICD-10-CM

## 2020-05-19 PROCEDURE — 99213 OFFICE O/P EST LOW 20 MIN: CPT | Mod: S$GLB,,, | Performed by: OBSTETRICS & GYNECOLOGY

## 2020-05-19 PROCEDURE — 87491 CHLMYD TRACH DNA AMP PROBE: CPT

## 2020-05-19 PROCEDURE — 99213 PR OFFICE/OUTPT VISIT, EST, LEVL III, 20-29 MIN: ICD-10-PCS | Mod: S$GLB,,, | Performed by: OBSTETRICS & GYNECOLOGY

## 2020-05-19 PROCEDURE — 99999 PR PBB SHADOW E&M-EST. PATIENT-LVL III: ICD-10-PCS | Mod: PBBFAC,,, | Performed by: OBSTETRICS & GYNECOLOGY

## 2020-05-19 PROCEDURE — 99999 PR PBB SHADOW E&M-EST. PATIENT-LVL III: CPT | Mod: PBBFAC,,, | Performed by: OBSTETRICS & GYNECOLOGY

## 2020-05-19 NOTE — PROGRESS NOTES
Subjective:       Patient ID: Noreen Olivas is a 25 y.o. female.    Chief Complaint:  Vaginal Discharge (C/o vaginal greenish discharge.)      History of Present Illness  26 y/o here for GC/ Chl cx  As has a hx of chlamydia   C/o greenish vaginal dc last week but then got period with no d/c since but is just worried  Using condoms but not all the time  Having different sexual partners/relationships  Rec condoms EVERYTIME until in a stable relationship    GYN & OB History  No LMP recorded.   Date of Last Pap: 2019    OB History    Para Term  AB Living   0 0 0 0 0 0   SAB TAB Ectopic Multiple Live Births   0 0 0 0 0       Review of Systems  Review of Systems   All other systems reviewed and are negative.       Objective:     Vitals:    20 1116   Temp: 97.6 °F (36.4 °C)       Physical Exam:   Constitutional: She is oriented to person, place, and time. She appears well-developed and well-nourished.    HENT:   Head: Normocephalic.       Pulmonary/Chest: Effort normal.          Genitourinary: Vagina normal and uterus normal. There is no rash or tenderness on the right labia. There is no rash or tenderness on the left labia. Cervix is normal. Right adnexum displays no mass and no tenderness. Left adnexum displays no mass and no tenderness. No vaginal discharge found. Cervix exhibits no discharge.   Genitourinary Comments: No d/c seen on exam No CMT             Musculoskeletal: Normal range of motion.       Neurological: She is alert and oriented to person, place, and time.    Skin: Skin is warm and dry.    Psychiatric: She has a normal mood and affect. Her behavior is normal.          Assessment/ Plan:     Orders Placed This Encounter    C. trachomatis/N. gonorrhoeae by AMP DNA       Noreen was seen today for vaginal discharge.    Diagnoses and all orders for this visit:    Vaginal discharge  -     C. trachomatis/N. gonorrhoeae by AMP DNA    History of chlamydia infection  -     C.  trachomatis/N. gonorrhoeae by AMP DNA        Follow up if symptoms worsen or fail to improve.      Health Maintenance       Date Due Completion Date    Lipid Panel 1993 ---    Pap Smear 06/20/2020 6/20/2019    Influenza Vaccine (Season Ended) 09/01/2020 10/24/2007    TETANUS VACCINE 08/21/2026 8/21/2016

## 2020-05-20 LAB
C TRACH DNA SPEC QL NAA+PROBE: NOT DETECTED
N GONORRHOEA DNA SPEC QL NAA+PROBE: NOT DETECTED

## 2020-09-10 ENCOUNTER — LAB VISIT (OUTPATIENT)
Dept: LAB | Facility: HOSPITAL | Age: 25
End: 2020-09-10
Attending: OBSTETRICS & GYNECOLOGY
Payer: COMMERCIAL

## 2020-09-10 ENCOUNTER — OFFICE VISIT (OUTPATIENT)
Dept: OBSTETRICS AND GYNECOLOGY | Facility: CLINIC | Age: 25
End: 2020-09-10
Attending: OBSTETRICS & GYNECOLOGY
Payer: COMMERCIAL

## 2020-09-10 VITALS
DIASTOLIC BLOOD PRESSURE: 80 MMHG | WEIGHT: 149.06 LBS | SYSTOLIC BLOOD PRESSURE: 122 MMHG | BODY MASS INDEX: 27.43 KG/M2 | HEIGHT: 62 IN

## 2020-09-10 DIAGNOSIS — Z11.3 SCREENING FOR STD (SEXUALLY TRANSMITTED DISEASE): ICD-10-CM

## 2020-09-10 DIAGNOSIS — Z12.4 ENCOUNTER FOR SCREENING FOR CERVICAL CANCER: ICD-10-CM

## 2020-09-10 DIAGNOSIS — Z01.419 ENCOUNTER FOR GYNECOLOGICAL EXAMINATION: Primary | ICD-10-CM

## 2020-09-10 PROCEDURE — 99395 PREV VISIT EST AGE 18-39: CPT | Mod: S$GLB,,, | Performed by: OBSTETRICS & GYNECOLOGY

## 2020-09-10 PROCEDURE — 87340 HEPATITIS B SURFACE AG IA: CPT

## 2020-09-10 PROCEDURE — 88175 CYTOPATH C/V AUTO FLUID REDO: CPT

## 2020-09-10 PROCEDURE — 86703 HIV-1/HIV-2 1 RESULT ANTBDY: CPT

## 2020-09-10 PROCEDURE — 86592 SYPHILIS TEST NON-TREP QUAL: CPT

## 2020-09-10 PROCEDURE — 99999 PR PBB SHADOW E&M-EST. PATIENT-LVL III: CPT | Mod: PBBFAC,,, | Performed by: OBSTETRICS & GYNECOLOGY

## 2020-09-10 PROCEDURE — 86696 HERPES SIMPLEX TYPE 2 TEST: CPT

## 2020-09-10 PROCEDURE — 87491 CHLMYD TRACH DNA AMP PROBE: CPT

## 2020-09-10 PROCEDURE — 99999 PR PBB SHADOW E&M-EST. PATIENT-LVL III: ICD-10-PCS | Mod: PBBFAC,,, | Performed by: OBSTETRICS & GYNECOLOGY

## 2020-09-10 PROCEDURE — 99395 PR PREVENTIVE VISIT,EST,18-39: ICD-10-PCS | Mod: S$GLB,,, | Performed by: OBSTETRICS & GYNECOLOGY

## 2020-09-10 RX ORDER — LISDEXAMFETAMINE DIMESYLATE 50 MG/1
50 CAPSULE ORAL
COMMUNITY
Start: 2020-09-09 | End: 2022-11-29

## 2020-09-10 NOTE — PROGRESS NOTES
Subjective:       Patient ID: Noreen Olivas is a 25 y.o. female.    Chief Complaint:  Well Woman (last pap 19 neg )      History of Present Illness  - here for annual. No complaints. Desires STD testing including labs.    Past Medical History:   Diagnosis Date    ADHD (attention deficit hyperactivity disorder)     History of chlamydia infection     Vaccine for human papilloma virus (HPV) types 6, 11, 16, and 18 administered , ,     3 of 3 received        History reviewed. No pertinent surgical history.      Current Outpatient Medications:     lisdexamfetamine (VYVANSE) 50 MG capsule, Take 50 mg by mouth., Disp: , Rfl:     temazepam (RESTORIL) 15 mg Cap, Take 15 mg by mouth once daily., Disp: , Rfl: 2    Review of patient's allergies indicates:  No Known Allergies    GYN & OB History  Patient's last menstrual period was 2020.   Date of Last Pap: 2019    OB History    Para Term  AB Living   0 0 0 0 0 0   SAB TAB Ectopic Multiple Live Births   0 0 0 0 0       Social History     Socioeconomic History    Marital status: Single     Spouse name: Not on file    Number of children: Not on file    Years of education: Not on file    Highest education level: Not on file   Occupational History    Not on file   Social Needs    Financial resource strain: Not on file    Food insecurity     Worry: Not on file     Inability: Not on file    Transportation needs     Medical: Not on file     Non-medical: Not on file   Tobacco Use    Smoking status: Never Smoker    Smokeless tobacco: Never Used   Substance and Sexual Activity    Alcohol use: Yes     Comment: socially     Drug use: Never    Sexual activity: Yes     Partners: Male     Birth control/protection: Condom   Lifestyle    Physical activity     Days per week: Not on file     Minutes per session: Not on file    Stress: Not on file   Relationships    Social connections     Talks on phone: Not on file     Gets  "together: Not on file     Attends Restoration service: Not on file     Active member of club or organization: Not on file     Attends meetings of clubs or organizations: Not on file     Relationship status: Not on file   Other Topics Concern    Not on file   Social History Narrative    Not on file       Family History   Problem Relation Age of Onset    No Known Problems Father     No Known Problems Mother     Breast cancer Neg Hx     Colon cancer Neg Hx     Ovarian cancer Neg Hx        Review of Systems  Review of Systems   Respiratory: Negative for shortness of breath.    Cardiovascular: Negative for chest pain and palpitations.   Gastrointestinal: Negative for blood in stool, nausea and vomiting.   Genitourinary:        - see HPI   Skin: Negative for rash and wound.   Allergic/Immunologic: Negative for immunocompromised state.   Neurological: Negative for dizziness and syncope.   Hematological: Negative for adenopathy.   Psychiatric/Behavioral: Negative for behavioral problems.        Objective:     Vitals:    09/10/20 1501   BP: 122/80   Weight: 67.6 kg (149 lb 0.5 oz)   Height: 5' 2" (1.575 m)       Physical Exam:   Constitutional: She is oriented to person, place, and time. She appears well-developed and well-nourished.        Pulmonary/Chest: Right breast exhibits no mass, no nipple discharge, no skin change, no tenderness and no swelling. Left breast exhibits no mass, no nipple discharge, no skin change, no tenderness and no swelling. Breasts are symmetrical.        Abdominal: Soft. She exhibits no distension. There is no abdominal tenderness.     Genitourinary:    Vagina and uterus normal.   There is no tenderness or lesion on the right labia. There is no tenderness or lesion on the left labia. Cervix is normal. Right adnexum displays no mass, no tenderness and no fullness. Left adnexum displays no mass, no tenderness and no fullness. Additional cervical findings: pap smear donenegative for vaginal " discharge          Musculoskeletal: Moves all extremeties.       Neurological: She is alert and oriented to person, place, and time.     Psychiatric: She has a normal mood and affect.        Assessment/ Plan:     Orders Placed This Encounter    C. trachomatis/N. gonorrhoeae by AMP DNA    RPR    HIV 1/2 Ag/Ab (4th Gen)    Hepatitis B Surface Antigen    Herpes Simplex Virus (HSV) Types 1 & 2, IgG, Herpes Titer    Liquid-Based Pap Smear, Screening       Noreen was seen today for well woman.    Diagnoses and all orders for this visit:    Encounter for gynecological examination    Encounter for screening for cervical cancer   -     Liquid-Based Pap Smear, Screening    Screening for STD (sexually transmitted disease)  -     Liquid-Based Pap Smear, Screening  -     C. trachomatis/N. gonorrhoeae by AMP DNA  -     RPR; Future  -     HIV 1/2 Ag/Ab (4th Gen); Future  -     Hepatitis B Surface Antigen; Future  -     Herpes Simplex Virus (HSV) Types 1 & 2, IgG, Herpes Titer; Future        Follow up in about 1 year (around 9/10/2021) for annual exam.

## 2020-09-11 LAB
HBV SURFACE AG SERPL QL IA: NEGATIVE
HIV 1+2 AB+HIV1 P24 AG SERPL QL IA: NEGATIVE
RPR SER QL: NORMAL

## 2020-09-12 LAB
HSV1 IGG SERPL QL IA: NEGATIVE
HSV2 IGG SERPL QL IA: NEGATIVE

## 2020-09-26 LAB
FINAL PATHOLOGIC DIAGNOSIS: NORMAL
Lab: NORMAL

## 2020-10-06 LAB
C TRACH DNA SPEC QL NAA+PROBE: DETECTED
N GONORRHOEA DNA SPEC QL NAA+PROBE: NOT DETECTED

## 2020-10-07 ENCOUNTER — PATIENT MESSAGE (OUTPATIENT)
Dept: OBSTETRICS AND GYNECOLOGY | Facility: CLINIC | Age: 25
End: 2020-10-07

## 2020-10-08 ENCOUNTER — TELEPHONE (OUTPATIENT)
Dept: OBSTETRICS AND GYNECOLOGY | Facility: CLINIC | Age: 25
End: 2020-10-08

## 2020-10-08 RX ORDER — AZITHROMYCIN 500 MG/1
TABLET, FILM COATED ORAL
Qty: 4 TABLET | Refills: 0 | Status: SHIPPED | OUTPATIENT
Start: 2020-10-08 | End: 2020-11-02

## 2020-10-08 NOTE — TELEPHONE ENCOUNTER
Spoke with patient. She received my voicemail. She'll make an appointment via the portal for a test of cure.

## 2020-10-24 ENCOUNTER — PATIENT MESSAGE (OUTPATIENT)
Dept: OBSTETRICS AND GYNECOLOGY | Facility: CLINIC | Age: 25
End: 2020-10-24

## 2020-10-25 ENCOUNTER — PATIENT MESSAGE (OUTPATIENT)
Dept: OBSTETRICS AND GYNECOLOGY | Facility: CLINIC | Age: 25
End: 2020-10-25

## 2020-10-25 DIAGNOSIS — N93.8 DUB (DYSFUNCTIONAL UTERINE BLEEDING): Primary | ICD-10-CM

## 2020-10-26 NOTE — TELEPHONE ENCOUNTER
Pt reports irregular bleeding. She has been changing a super tampon about every 2 hours.  It started over a week ago but wasn't heavy until this weekend.  Negative UPT.  Not on OCPs.  No s/s of anemia.      Do you recommend an US?

## 2020-10-26 NOTE — TELEPHONE ENCOUNTER
Pt sent 2 portal messages. Attached is the original message:   Noreen Olivas, Lary MARTINEZ MD 2 days ago        I am contacting you in regards to access bleeding on my period. I've been having large blood clots and I am bleeding through a tampon and liner. What should I do?

## 2020-10-30 NOTE — TELEPHONE ENCOUNTER
See message to patient. I put an u/s order in. Please call her and schedule Monday or Tuesday. If bleeding is still heavy, I'll send in a birth control pill taper.

## 2020-10-30 NOTE — TELEPHONE ENCOUNTER
Spoke with pt, she received portal messages.  No questions at this time.  Scheduled US and appt with Dr. Elias Monday.  Aware of US prep and location

## 2020-11-02 ENCOUNTER — OFFICE VISIT (OUTPATIENT)
Dept: OBSTETRICS AND GYNECOLOGY | Facility: CLINIC | Age: 25
End: 2020-11-02
Attending: OBSTETRICS & GYNECOLOGY
Payer: COMMERCIAL

## 2020-11-02 VITALS
HEIGHT: 62 IN | SYSTOLIC BLOOD PRESSURE: 102 MMHG | DIASTOLIC BLOOD PRESSURE: 60 MMHG | BODY MASS INDEX: 27.43 KG/M2 | WEIGHT: 149.06 LBS

## 2020-11-02 DIAGNOSIS — N93.8 DUB (DYSFUNCTIONAL UTERINE BLEEDING): Primary | ICD-10-CM

## 2020-11-02 DIAGNOSIS — Z11.3 SCREENING FOR STD (SEXUALLY TRANSMITTED DISEASE): ICD-10-CM

## 2020-11-02 PROCEDURE — 99213 PR OFFICE/OUTPT VISIT, EST, LEVL III, 20-29 MIN: ICD-10-PCS | Mod: S$GLB,,, | Performed by: OBSTETRICS & GYNECOLOGY

## 2020-11-02 PROCEDURE — 99999 PR PBB SHADOW E&M-EST. PATIENT-LVL III: CPT | Mod: PBBFAC,,, | Performed by: OBSTETRICS & GYNECOLOGY

## 2020-11-02 PROCEDURE — 99999 PR PBB SHADOW E&M-EST. PATIENT-LVL III: ICD-10-PCS | Mod: PBBFAC,,, | Performed by: OBSTETRICS & GYNECOLOGY

## 2020-11-02 PROCEDURE — 99213 OFFICE O/P EST LOW 20 MIN: CPT | Mod: S$GLB,,, | Performed by: OBSTETRICS & GYNECOLOGY

## 2020-11-02 NOTE — PROGRESS NOTES
Subjective:       Patient ID: Noreen Olivas is a 25 y.o. female.    Chief Complaint:  Vaginal Bleeding (bleeding starting 10/18, ended yesterday. cramping and bloating)      History of Present Illness  - patient reports for u/s and visit due to heavy prolonged vaginal bleeding. She reports that bleeding stopped yesterday.  - she's also felt bloated.  - patient is under a lot of stress. Her father committed suicide last week. She is an only child of  parents.    Past Medical History:   Diagnosis Date    ADHD (attention deficit hyperactivity disorder)     History of chlamydia infection     Vaccine for human papilloma virus (HPV) types 6, 11, 16, and 18 administered , ,     3 of 3 received        History reviewed. No pertinent surgical history.      Current Outpatient Medications:     lisdexamfetamine (VYVANSE) 50 MG capsule, Take 50 mg by mouth., Disp: , Rfl:     temazepam (RESTORIL) 15 mg Cap, Take 15 mg by mouth once daily., Disp: , Rfl: 2    Review of patient's allergies indicates:  No Known Allergies    GYN & OB History  Patient's last menstrual period was 10/18/2020.   Date of Last Pap: 2020    OB History    Para Term  AB Living   0 0 0 0 0 0   SAB TAB Ectopic Multiple Live Births   0 0 0 0 0       Social History     Socioeconomic History    Marital status: Single     Spouse name: Not on file    Number of children: Not on file    Years of education: Not on file    Highest education level: Not on file   Occupational History    Not on file   Social Needs    Financial resource strain: Not on file    Food insecurity     Worry: Not on file     Inability: Not on file    Transportation needs     Medical: Not on file     Non-medical: Not on file   Tobacco Use    Smoking status: Never Smoker    Smokeless tobacco: Never Used   Substance and Sexual Activity    Alcohol use: Yes     Comment: socially     Drug use: Never    Sexual activity: Yes     Partners:  "Male     Birth control/protection: Condom   Lifestyle    Physical activity     Days per week: Not on file     Minutes per session: Not on file    Stress: Not on file   Relationships    Social connections     Talks on phone: Not on file     Gets together: Not on file     Attends Yarsanism service: Not on file     Active member of club or organization: Not on file     Attends meetings of clubs or organizations: Not on file     Relationship status: Not on file   Other Topics Concern    Not on file   Social History Narrative    Not on file       Family History   Problem Relation Age of Onset    No Known Problems Father     No Known Problems Mother     Breast cancer Neg Hx     Colon cancer Neg Hx     Ovarian cancer Neg Hx        Review of Systems  Review of Systems   All other systems reviewed and are negative.       Objective:     Vitals:    11/02/20 1350   BP: 102/60   Weight: 67.6 kg (149 lb 0.5 oz)   Height: 5' 2" (1.575 m)       Physical Exam:   Constitutional: She appears well-developed and well-nourished. She is cooperative. No distress.             Abdominal: Soft. Normal appearance. There is no abdominal tenderness.     Genitourinary:    Vagina and uterus normal.   There is no rash, tenderness or lesion on the right labia. There is no rash, tenderness or lesion on the left labia. Cervix is normal. Right adnexum displays no mass, no tenderness and no fullness. Left adnexum displays no mass, no tenderness and no fullness.               Neurological: She is alert.          Assessment/ Plan:     Orders Placed This Encounter    C. trachomatis/N. gonorrhoeae by AMP DNA       Noreen was seen today for vaginal bleeding.    Diagnoses and all orders for this visit:    DUB (dysfunctional uterine bleeding)    Screening for STD (sexually transmitted disease)  -     Cancel: C. trachomatis/N. gonorrhoeae by AMP DNA Other; Cervicovaginal  -     C. trachomatis/N. gonorrhoeae by AMP DNA    - reviewed u/s with patient: " normal  - patient reports she doesn't like how she feels on ocps. If bleeding recurs, she'll call me to discuss ocp taper vs Lysteda prn. Verbalized understanding.  - offered for patient to reach out anytime she thinks I can help. She'll let me know if there's anything I can do for her.      Follow up for annual exam.

## 2020-11-04 LAB
C TRACH RRNA SPEC QL NAA+PROBE: NEGATIVE
N GONORRHOEA RRNA SPEC QL NAA+PROBE: NEGATIVE

## 2020-12-30 ENCOUNTER — OFFICE VISIT (OUTPATIENT)
Dept: OBSTETRICS AND GYNECOLOGY | Facility: CLINIC | Age: 25
End: 2020-12-30
Payer: COMMERCIAL

## 2020-12-30 ENCOUNTER — LAB VISIT (OUTPATIENT)
Dept: LAB | Facility: OTHER | Age: 25
End: 2020-12-30
Attending: NURSE PRACTITIONER
Payer: COMMERCIAL

## 2020-12-30 VITALS
HEIGHT: 62 IN | DIASTOLIC BLOOD PRESSURE: 76 MMHG | WEIGHT: 146.81 LBS | BODY MASS INDEX: 27.02 KG/M2 | SYSTOLIC BLOOD PRESSURE: 124 MMHG

## 2020-12-30 DIAGNOSIS — Z11.3 SCREENING FOR STD (SEXUALLY TRANSMITTED DISEASE): Primary | ICD-10-CM

## 2020-12-30 DIAGNOSIS — Z11.3 SCREENING FOR STD (SEXUALLY TRANSMITTED DISEASE): ICD-10-CM

## 2020-12-30 PROCEDURE — 86703 HIV-1/HIV-2 1 RESULT ANTBDY: CPT

## 2020-12-30 PROCEDURE — 87340 HEPATITIS B SURFACE AG IA: CPT

## 2020-12-30 PROCEDURE — 36415 COLL VENOUS BLD VENIPUNCTURE: CPT

## 2020-12-30 PROCEDURE — 99999 PR PBB SHADOW E&M-EST. PATIENT-LVL III: ICD-10-PCS | Mod: PBBFAC,,, | Performed by: NURSE PRACTITIONER

## 2020-12-30 PROCEDURE — 99213 PR OFFICE/OUTPT VISIT, EST, LEVL III, 20-29 MIN: ICD-10-PCS | Mod: S$GLB,,, | Performed by: NURSE PRACTITIONER

## 2020-12-30 PROCEDURE — 99999 PR PBB SHADOW E&M-EST. PATIENT-LVL III: CPT | Mod: PBBFAC,,, | Performed by: NURSE PRACTITIONER

## 2020-12-30 PROCEDURE — 86592 SYPHILIS TEST NON-TREP QUAL: CPT

## 2020-12-30 PROCEDURE — 99213 OFFICE O/P EST LOW 20 MIN: CPT | Mod: S$GLB,,, | Performed by: NURSE PRACTITIONER

## 2020-12-30 RX ORDER — ALPRAZOLAM 0.5 MG/1
0.5 TABLET ORAL NIGHTLY
COMMUNITY
Start: 2020-12-14 | End: 2023-12-06 | Stop reason: SDUPTHER

## 2020-12-30 NOTE — PROGRESS NOTES
CC: Screening for sexually transmitted infection    Noreen Olivas is a 25 y.o. female  presents for STD screening  Patient's last menstrual period was 2020 (approximate)..  Denies any new rashes or lesions.  Denies pelvic or abdominal pain.  Denies vulvovaginal itching or irritation.  Denies abnormal or foul vaginal discharge. Reports recent possible chlamydia exposure.          ROS:  GENERAL: Denies weight gain or weight loss. Feeling well overall.   SKIN: Denies rash or lesions.   HEAD: Denies head injury or headache.   NODES: Denies enlarged lymph nodes.   CHEST: Denies chest pain or shortness of breath.   CARDIOVASCULAR: Denies palpitations or left sided chest pain.   ABDOMEN: No abdominal pain, constipation, diarrhea, nausea, vomiting or rectal bleeding.   URINARY: No frequency, dysuria, hematuria, or burning on urination.  REPRODUCTIVE: See HPI.   BREASTS: The patient performs breast self-examination and denies pain, lumps, or nipple discharge.   HEMATOLOGIC: No easy bruisability or excessive bleeding.   MUSCULOSKELETAL: Denies joint pain or swelling.   NEUROLOGIC: Denies syncope or weakness.   PSYCHIATRIC: Denies depression, anxiety or mood swings.      PHYSICAL EXAM:    APPEARANCE: Well nourished, well developed, in no acute distress.  AFFECT: Alert and oriented x 3  SKIN: Warm, dry, & intact. No acne or hirsutism.  NECK: Neck symmetric  NODES: No inguinal or femoral lymph node enlargement  CHEST:  Easy, even breaths.  ABDOMEN: Soft.  Nontender, nondistended.  PELVIC: Normal external genitalia without lesions.  Normal hair distribution.  Adequate perineal body, normal urethral meatus.    Vagina moist and well rugated without lesions or discharge.  Cervix pink, without lesions, discharge or tenderness.  No significant cystocele or rectocele.    Bimanual exam shows uterus to be normal size, regular, mobile and nontender.  Adnexa without masses or tenderness.    EXTREMITIES: No  edema.    Screening for STD (sexually transmitted disease)  -     HIV 1/2 Ag/Ab (4th Gen); Future; Expected date: 12/30/2020  -     RPR; Future; Expected date: 12/30/2020  -     Hepatitis B Surface Antigen; Future; Expected date: 12/30/2020  -     Cancel: Vaginosis Screen by DNA Probe  -     Cancel: C. trachomatis/N. gonorrhoeae by AMP DNA  -     NuSwab Vaginitis Plus (VG+)    NuSwab+    STD Blood Panel    Patient was counseled today on prevention of STDs with use of condoms.  We also reviewed A.C.S. Pap guidelines and recommendations for yearly pelvic exams, mammograms and monthly self breast exams; to see her PCP for other health maintenance.     Followup pending lab results.      DENNIS RemyC

## 2021-01-03 LAB
A VAGINAE DNA VAG QL NAA+PROBE: NORMAL SCORE
BVAB2 DNA VAG QL NAA+PROBE: NORMAL SCORE
C ALBICANS DNA VAG QL NAA+PROBE: NEGATIVE
C GLABRATA DNA VAG QL NAA+PROBE: NEGATIVE
C TRACH DNA VAG QL NAA+PROBE: NEGATIVE
MEGA1 DNA VAG QL NAA+PROBE: NORMAL SCORE
N GONORRHOEA DNA VAG QL NAA+PROBE: NEGATIVE
T VAGINALIS DNA VAG QL NAA+PROBE: NEGATIVE

## 2021-04-16 ENCOUNTER — PATIENT MESSAGE (OUTPATIENT)
Dept: RESEARCH | Facility: HOSPITAL | Age: 26
End: 2021-04-16

## 2021-06-18 ENCOUNTER — OFFICE VISIT (OUTPATIENT)
Dept: DERMATOLOGY | Facility: CLINIC | Age: 26
End: 2021-06-18
Payer: COMMERCIAL

## 2021-06-18 DIAGNOSIS — Z80.8 FAMILY HISTORY OF MALIGNANT MELANOMA: ICD-10-CM

## 2021-06-18 DIAGNOSIS — Z12.83 SCREENING FOR SKIN CANCER: ICD-10-CM

## 2021-06-18 DIAGNOSIS — L81.4 LENTIGINES: ICD-10-CM

## 2021-06-18 DIAGNOSIS — D22.9 MULTIPLE BENIGN NEVI: Primary | ICD-10-CM

## 2021-06-18 PROCEDURE — 99203 OFFICE O/P NEW LOW 30 MIN: CPT | Mod: S$GLB,,, | Performed by: DERMATOLOGY

## 2021-06-18 PROCEDURE — 99999 PR PBB SHADOW E&M-EST. PATIENT-LVL II: CPT | Mod: PBBFAC,,, | Performed by: DERMATOLOGY

## 2021-06-18 PROCEDURE — 99203 PR OFFICE/OUTPT VISIT, NEW, LEVL III, 30-44 MIN: ICD-10-PCS | Mod: S$GLB,,, | Performed by: DERMATOLOGY

## 2021-06-18 PROCEDURE — 99999 PR PBB SHADOW E&M-EST. PATIENT-LVL II: ICD-10-PCS | Mod: PBBFAC,,, | Performed by: DERMATOLOGY

## 2021-06-28 ENCOUNTER — LAB VISIT (OUTPATIENT)
Dept: LAB | Facility: OTHER | Age: 26
End: 2021-06-28
Attending: OBSTETRICS & GYNECOLOGY
Payer: COMMERCIAL

## 2021-06-28 ENCOUNTER — OFFICE VISIT (OUTPATIENT)
Dept: OBSTETRICS AND GYNECOLOGY | Facility: CLINIC | Age: 26
End: 2021-06-28
Attending: OBSTETRICS & GYNECOLOGY
Payer: COMMERCIAL

## 2021-06-28 VITALS
WEIGHT: 156.88 LBS | SYSTOLIC BLOOD PRESSURE: 114 MMHG | DIASTOLIC BLOOD PRESSURE: 68 MMHG | BODY MASS INDEX: 28.87 KG/M2 | HEIGHT: 62 IN

## 2021-06-28 DIAGNOSIS — Z11.3 SCREENING FOR STD (SEXUALLY TRANSMITTED DISEASE): Primary | ICD-10-CM

## 2021-06-28 DIAGNOSIS — Z11.3 SCREENING FOR STD (SEXUALLY TRANSMITTED DISEASE): ICD-10-CM

## 2021-06-28 DIAGNOSIS — N89.8 VAGINAL IRRITATION: ICD-10-CM

## 2021-06-28 PROCEDURE — 99999 PR PBB SHADOW E&M-EST. PATIENT-LVL III: ICD-10-PCS | Mod: PBBFAC,,, | Performed by: OBSTETRICS & GYNECOLOGY

## 2021-06-28 PROCEDURE — 99213 OFFICE O/P EST LOW 20 MIN: CPT | Mod: S$GLB,,, | Performed by: OBSTETRICS & GYNECOLOGY

## 2021-06-28 PROCEDURE — 87491 CHLMYD TRACH DNA AMP PROBE: CPT | Performed by: OBSTETRICS & GYNECOLOGY

## 2021-06-28 PROCEDURE — 99999 PR PBB SHADOW E&M-EST. PATIENT-LVL III: CPT | Mod: PBBFAC,,, | Performed by: OBSTETRICS & GYNECOLOGY

## 2021-06-28 PROCEDURE — 87591 N.GONORRHOEAE DNA AMP PROB: CPT | Performed by: OBSTETRICS & GYNECOLOGY

## 2021-06-28 PROCEDURE — 86703 HIV-1/HIV-2 1 RESULT ANTBDY: CPT | Performed by: OBSTETRICS & GYNECOLOGY

## 2021-06-28 PROCEDURE — 99213 PR OFFICE/OUTPT VISIT, EST, LEVL III, 20-29 MIN: ICD-10-PCS | Mod: S$GLB,,, | Performed by: OBSTETRICS & GYNECOLOGY

## 2021-06-28 PROCEDURE — 36415 COLL VENOUS BLD VENIPUNCTURE: CPT | Performed by: OBSTETRICS & GYNECOLOGY

## 2021-06-28 PROCEDURE — 87340 HEPATITIS B SURFACE AG IA: CPT | Performed by: OBSTETRICS & GYNECOLOGY

## 2021-06-28 PROCEDURE — 86592 SYPHILIS TEST NON-TREP QUAL: CPT | Performed by: OBSTETRICS & GYNECOLOGY

## 2021-07-01 LAB
C TRACH DNA SPEC QL NAA+PROBE: NOT DETECTED
N GONORRHOEA DNA SPEC QL NAA+PROBE: NOT DETECTED

## 2021-07-09 ENCOUNTER — PATIENT MESSAGE (OUTPATIENT)
Dept: OBSTETRICS AND GYNECOLOGY | Facility: CLINIC | Age: 26
End: 2021-07-09

## 2021-07-12 RX ORDER — METRONIDAZOLE 500 MG/1
500 TABLET ORAL 2 TIMES DAILY
Qty: 14 TABLET | Refills: 0 | Status: SHIPPED | OUTPATIENT
Start: 2021-07-12 | End: 2021-09-13

## 2021-08-17 ENCOUNTER — PATIENT MESSAGE (OUTPATIENT)
Dept: OBSTETRICS AND GYNECOLOGY | Facility: CLINIC | Age: 26
End: 2021-08-17

## 2021-08-17 DIAGNOSIS — N94.6 DYSMENORRHEA: Primary | ICD-10-CM

## 2021-08-17 RX ORDER — ACETAMINOPHEN AND CODEINE PHOSPHATE 120; 12 MG/5ML; MG/5ML
1 SOLUTION ORAL DAILY
Qty: 28 TABLET | Refills: 11 | Status: SHIPPED | OUTPATIENT
Start: 2021-08-17 | End: 2022-11-29

## 2021-08-17 RX ORDER — NAPROXEN SODIUM 550 MG/1
550 TABLET ORAL
Qty: 30 TABLET | Refills: 0 | Status: SHIPPED | OUTPATIENT
Start: 2021-08-17 | End: 2024-02-28

## 2021-09-13 ENCOUNTER — LAB VISIT (OUTPATIENT)
Dept: LAB | Facility: OTHER | Age: 26
End: 2021-09-13
Attending: OBSTETRICS & GYNECOLOGY
Payer: COMMERCIAL

## 2021-09-13 ENCOUNTER — OFFICE VISIT (OUTPATIENT)
Dept: OBSTETRICS AND GYNECOLOGY | Facility: CLINIC | Age: 26
End: 2021-09-13
Attending: OBSTETRICS & GYNECOLOGY
Payer: COMMERCIAL

## 2021-09-13 VITALS
DIASTOLIC BLOOD PRESSURE: 78 MMHG | SYSTOLIC BLOOD PRESSURE: 118 MMHG | HEIGHT: 62 IN | BODY MASS INDEX: 30.12 KG/M2 | WEIGHT: 163.69 LBS

## 2021-09-13 DIAGNOSIS — Z11.3 SCREENING FOR STD (SEXUALLY TRANSMITTED DISEASE): ICD-10-CM

## 2021-09-13 DIAGNOSIS — N93.8 DUB (DYSFUNCTIONAL UTERINE BLEEDING): ICD-10-CM

## 2021-09-13 DIAGNOSIS — N94.6 DYSMENORRHEA: ICD-10-CM

## 2021-09-13 DIAGNOSIS — Z12.4 ENCOUNTER FOR SCREENING FOR CERVICAL CANCER: ICD-10-CM

## 2021-09-13 DIAGNOSIS — Z01.419 ENCOUNTER FOR GYNECOLOGICAL EXAMINATION: Primary | ICD-10-CM

## 2021-09-13 PROCEDURE — 99999 PR PBB SHADOW E&M-EST. PATIENT-LVL III: CPT | Mod: PBBFAC,,, | Performed by: OBSTETRICS & GYNECOLOGY

## 2021-09-13 PROCEDURE — 87389 HIV-1 AG W/HIV-1&-2 AB AG IA: CPT | Performed by: OBSTETRICS & GYNECOLOGY

## 2021-09-13 PROCEDURE — 99395 PR PREVENTIVE VISIT,EST,18-39: ICD-10-PCS | Mod: S$GLB,,, | Performed by: OBSTETRICS & GYNECOLOGY

## 2021-09-13 PROCEDURE — 99999 PR PBB SHADOW E&M-EST. PATIENT-LVL III: ICD-10-PCS | Mod: PBBFAC,,, | Performed by: OBSTETRICS & GYNECOLOGY

## 2021-09-13 PROCEDURE — 87491 CHLMYD TRACH DNA AMP PROBE: CPT | Performed by: OBSTETRICS & GYNECOLOGY

## 2021-09-13 PROCEDURE — 86695 HERPES SIMPLEX TYPE 1 TEST: CPT | Performed by: OBSTETRICS & GYNECOLOGY

## 2021-09-13 PROCEDURE — 88175 CYTOPATH C/V AUTO FLUID REDO: CPT | Performed by: OBSTETRICS & GYNECOLOGY

## 2021-09-13 PROCEDURE — 86592 SYPHILIS TEST NON-TREP QUAL: CPT | Performed by: OBSTETRICS & GYNECOLOGY

## 2021-09-13 PROCEDURE — 87340 HEPATITIS B SURFACE AG IA: CPT | Performed by: OBSTETRICS & GYNECOLOGY

## 2021-09-13 PROCEDURE — 99395 PREV VISIT EST AGE 18-39: CPT | Mod: S$GLB,,, | Performed by: OBSTETRICS & GYNECOLOGY

## 2021-09-13 PROCEDURE — 36415 COLL VENOUS BLD VENIPUNCTURE: CPT | Performed by: OBSTETRICS & GYNECOLOGY

## 2021-09-13 PROCEDURE — 87591 N.GONORRHOEAE DNA AMP PROB: CPT | Performed by: OBSTETRICS & GYNECOLOGY

## 2021-09-14 LAB
C TRACH DNA SPEC QL NAA+PROBE: NOT DETECTED
HBV SURFACE AG SERPL QL IA: NEGATIVE
HIV 1+2 AB+HIV1 P24 AG SERPL QL IA: NEGATIVE
HSV1 IGG SERPL QL IA: NEGATIVE
HSV2 IGG SERPL QL IA: NEGATIVE
N GONORRHOEA DNA SPEC QL NAA+PROBE: NOT DETECTED
RPR SER QL: NORMAL

## 2021-09-30 ENCOUNTER — TELEPHONE (OUTPATIENT)
Dept: OBSTETRICS AND GYNECOLOGY | Facility: CLINIC | Age: 26
End: 2021-09-30

## 2021-10-19 LAB
CYTOLOGIST CVX/VAG CYTO: NORMAL
CYTOLOGY CVX/VAG DOC CYTO: NORMAL
CYTOLOGY CVX/VAG DOC THIN PREP: NORMAL
CYTOLOGY THINPREP PAP COMMENT: NORMAL
CYTOLOGY THINPREP PAP COMMENT: NORMAL
PAP NOTE: NORMAL
PATHOLOGIST CVX/VAG CYTO: NORMAL
STAT OF ADQ CVX/VAG CYTO-IMP: NORMAL

## 2022-11-28 ENCOUNTER — HOSPITAL ENCOUNTER (EMERGENCY)
Facility: OTHER | Age: 27
Discharge: HOME | End: 2022-11-28
Attending: EMERGENCY MEDICINE
Payer: COMMERCIAL

## 2022-11-28 VITALS
SYSTOLIC BLOOD PRESSURE: 138 MMHG | DIASTOLIC BLOOD PRESSURE: 91 MMHG | HEIGHT: 62 IN | RESPIRATION RATE: 20 BRPM | TEMPERATURE: 98 F | BODY MASS INDEX: 29.44 KG/M2 | HEART RATE: 100 BPM | WEIGHT: 160 LBS | OXYGEN SATURATION: 98 %

## 2022-11-28 DIAGNOSIS — A60.09 HERPES SIMPLEX OF FEMALE GENITALIA: Primary | ICD-10-CM

## 2022-11-28 LAB
CTP QC/QA: YES
POC MOLECULAR INFLUENZA A AGN: NEGATIVE
POC MOLECULAR INFLUENZA B AGN: NEGATIVE

## 2022-11-28 PROCEDURE — 99283 EMERGENCY DEPT VISIT LOW MDM: CPT

## 2022-11-28 PROCEDURE — 87529 HSV DNA AMP PROBE: CPT | Performed by: NURSE PRACTITIONER

## 2022-11-28 PROCEDURE — 25000003 PHARM REV CODE 250: Performed by: EMERGENCY MEDICINE

## 2022-11-28 RX ORDER — VALACYCLOVIR HYDROCHLORIDE 1 G/1
1000 TABLET, FILM COATED ORAL EVERY 12 HOURS
Qty: 20 TABLET | Refills: 0 | Status: SHIPPED | OUTPATIENT
Start: 2022-11-28 | End: 2022-12-05

## 2022-11-28 RX ORDER — VALACYCLOVIR HYDROCHLORIDE 500 MG/1
1000 TABLET, FILM COATED ORAL
Status: COMPLETED | OUTPATIENT
Start: 2022-11-28 | End: 2022-11-28

## 2022-11-28 RX ORDER — VALACYCLOVIR HYDROCHLORIDE 500 MG/1
1000 TABLET, FILM COATED ORAL 2 TIMES DAILY
Status: DISCONTINUED | OUTPATIENT
Start: 2022-11-28 | End: 2022-11-28

## 2022-11-28 RX ADMIN — VALACYCLOVIR HYDROCHLORIDE 1000 MG: 500 TABLET, FILM COATED ORAL at 09:11

## 2022-11-28 NOTE — FIRST PROVIDER EVALUATION
Emergency Department TeleTriage Encounter Note      CHIEF COMPLAINT    Chief Complaint   Patient presents with    Cough     C/o cough, congestion, nausea, diarrhea, body aches, and joint pain x1 week. Also blister-like rash to vaginal area for the past few days       VITAL SIGNS   Initial Vitals [11/28/22 1747]   BP Pulse Resp Temp SpO2   (!) 158/96 104 20 99.4 °F (37.4 °C) 98 %      MAP       --            ALLERGIES    Review of patient's allergies indicates:  No Known Allergies    PROVIDER TRIAGE NOTE  See above.        ORDERS  Labs Reviewed   HIV 1 / 2 ANTIBODY   HEPATITIS C ANTIBODY   HERPES SIMPLEX (HSV) BY RAPID PCR, NON-BLOOD   POCT INFLUENZA A/B MOLECULAR       ED Orders (720h ago, onward)      Start Ordered     Status Ordering Provider    11/28/22 1757 11/28/22 1756  HSV by Rapid PCR, Non-Blood [facility]; Skin  Once         Ordered ERWIN HOBSON    11/28/22 1756 11/28/22 1755  POCT Influenza A/B Molecular  Once         Ordered ERWIN HOBSON    11/28/22 1751 11/28/22 1750  HIV 1/2 Ag/Ab (4th Gen)  STAT         Ordered EM GUILLAUME    11/28/22 1751 11/28/22 1750  Hepatitis C Antibody  STAT         Ordered EM GUILLAUME              Virtual Visit Note: The provider triage portion of this emergency department evaluation and documentation was performed via [employer], a HIPAA-compliant telemedicine application, in concert with a tele-presenter in the room. A face to face patient evaluation with one of my colleagues will occur once the patient is placed in an emergency department room.      DISCLAIMER: This note was prepared with [employer] voice recognition transcription software. Garbled syntax, mangled pronouns, and other bizarre constructions may be attributed to that software system.

## 2022-11-29 ENCOUNTER — OFFICE VISIT (OUTPATIENT)
Dept: OBSTETRICS AND GYNECOLOGY | Facility: CLINIC | Age: 27
End: 2022-11-29
Attending: OBSTETRICS & GYNECOLOGY
Payer: COMMERCIAL

## 2022-11-29 ENCOUNTER — TELEPHONE (OUTPATIENT)
Dept: OBSTETRICS AND GYNECOLOGY | Facility: CLINIC | Age: 27
End: 2022-11-29

## 2022-11-29 ENCOUNTER — LAB VISIT (OUTPATIENT)
Dept: LAB | Facility: OTHER | Age: 27
End: 2022-11-29
Attending: OBSTETRICS & GYNECOLOGY
Payer: COMMERCIAL

## 2022-11-29 VITALS
WEIGHT: 177.81 LBS | HEIGHT: 62 IN | SYSTOLIC BLOOD PRESSURE: 124 MMHG | BODY MASS INDEX: 32.72 KG/M2 | DIASTOLIC BLOOD PRESSURE: 88 MMHG

## 2022-11-29 DIAGNOSIS — N89.8 VAGINAL LESION: ICD-10-CM

## 2022-11-29 DIAGNOSIS — Z11.3 SCREENING FOR STD (SEXUALLY TRANSMITTED DISEASE): ICD-10-CM

## 2022-11-29 DIAGNOSIS — Z01.419 ENCOUNTER FOR GYNECOLOGICAL EXAMINATION: Primary | ICD-10-CM

## 2022-11-29 DIAGNOSIS — Z12.4 SCREENING FOR CERVICAL CANCER: ICD-10-CM

## 2022-11-29 LAB
HBV SURFACE AG SERPL QL IA: NORMAL
HCV AB SERPL QL IA: NORMAL
HIV 1+2 AB+HIV1 P24 AG SERPL QL IA: NORMAL
RPR SER QL: NORMAL

## 2022-11-29 PROCEDURE — 88175 CYTOPATH C/V AUTO FLUID REDO: CPT | Performed by: OBSTETRICS & GYNECOLOGY

## 2022-11-29 PROCEDURE — 1160F RVW MEDS BY RX/DR IN RCRD: CPT | Mod: CPTII,S$GLB,, | Performed by: OBSTETRICS & GYNECOLOGY

## 2022-11-29 PROCEDURE — 87340 HEPATITIS B SURFACE AG IA: CPT | Performed by: OBSTETRICS & GYNECOLOGY

## 2022-11-29 PROCEDURE — 87491 CHLMYD TRACH DNA AMP PROBE: CPT | Performed by: OBSTETRICS & GYNECOLOGY

## 2022-11-29 PROCEDURE — 3008F BODY MASS INDEX DOCD: CPT | Mod: CPTII,S$GLB,, | Performed by: OBSTETRICS & GYNECOLOGY

## 2022-11-29 PROCEDURE — 99395 PR PREVENTIVE VISIT,EST,18-39: ICD-10-PCS | Mod: S$GLB,,, | Performed by: OBSTETRICS & GYNECOLOGY

## 2022-11-29 PROCEDURE — 1159F MED LIST DOCD IN RCRD: CPT | Mod: CPTII,S$GLB,, | Performed by: OBSTETRICS & GYNECOLOGY

## 2022-11-29 PROCEDURE — 3079F DIAST BP 80-89 MM HG: CPT | Mod: CPTII,S$GLB,, | Performed by: OBSTETRICS & GYNECOLOGY

## 2022-11-29 PROCEDURE — 36415 COLL VENOUS BLD VENIPUNCTURE: CPT | Performed by: OBSTETRICS & GYNECOLOGY

## 2022-11-29 PROCEDURE — 99999 PR PBB SHADOW E&M-EST. PATIENT-LVL III: CPT | Mod: PBBFAC,,, | Performed by: OBSTETRICS & GYNECOLOGY

## 2022-11-29 PROCEDURE — 87591 N.GONORRHOEAE DNA AMP PROB: CPT | Performed by: OBSTETRICS & GYNECOLOGY

## 2022-11-29 PROCEDURE — 99395 PREV VISIT EST AGE 18-39: CPT | Mod: S$GLB,,, | Performed by: OBSTETRICS & GYNECOLOGY

## 2022-11-29 PROCEDURE — 87389 HIV-1 AG W/HIV-1&-2 AB AG IA: CPT | Performed by: OBSTETRICS & GYNECOLOGY

## 2022-11-29 PROCEDURE — 87529 HSV DNA AMP PROBE: CPT | Performed by: OBSTETRICS & GYNECOLOGY

## 2022-11-29 PROCEDURE — 86695 HERPES SIMPLEX TYPE 1 TEST: CPT | Performed by: OBSTETRICS & GYNECOLOGY

## 2022-11-29 PROCEDURE — 1159F PR MEDICATION LIST DOCUMENTED IN MEDICAL RECORD: ICD-10-PCS | Mod: CPTII,S$GLB,, | Performed by: OBSTETRICS & GYNECOLOGY

## 2022-11-29 PROCEDURE — 86592 SYPHILIS TEST NON-TREP QUAL: CPT | Performed by: OBSTETRICS & GYNECOLOGY

## 2022-11-29 PROCEDURE — 3074F PR MOST RECENT SYSTOLIC BLOOD PRESSURE < 130 MM HG: ICD-10-PCS | Mod: CPTII,S$GLB,, | Performed by: OBSTETRICS & GYNECOLOGY

## 2022-11-29 PROCEDURE — 86803 HEPATITIS C AB TEST: CPT | Performed by: OBSTETRICS & GYNECOLOGY

## 2022-11-29 PROCEDURE — 3079F PR MOST RECENT DIASTOLIC BLOOD PRESSURE 80-89 MM HG: ICD-10-PCS | Mod: CPTII,S$GLB,, | Performed by: OBSTETRICS & GYNECOLOGY

## 2022-11-29 PROCEDURE — 99999 PR PBB SHADOW E&M-EST. PATIENT-LVL III: ICD-10-PCS | Mod: PBBFAC,,, | Performed by: OBSTETRICS & GYNECOLOGY

## 2022-11-29 PROCEDURE — 3074F SYST BP LT 130 MM HG: CPT | Mod: CPTII,S$GLB,, | Performed by: OBSTETRICS & GYNECOLOGY

## 2022-11-29 PROCEDURE — 3008F PR BODY MASS INDEX (BMI) DOCUMENTED: ICD-10-PCS | Mod: CPTII,S$GLB,, | Performed by: OBSTETRICS & GYNECOLOGY

## 2022-11-29 PROCEDURE — 1160F PR REVIEW ALL MEDS BY PRESCRIBER/CLIN PHARMACIST DOCUMENTED: ICD-10-PCS | Mod: CPTII,S$GLB,, | Performed by: OBSTETRICS & GYNECOLOGY

## 2022-11-29 NOTE — ED TRIAGE NOTES
Pt presents to the ER with complaints of cough, congestion, nausea, diarrhea, body aches, and joint pain x1 week. Also blister-like rash to vaginal area for the past few days

## 2022-11-29 NOTE — ED PROVIDER NOTES
Encounter Date: 11/28/2022       History     Chief Complaint   Patient presents with    Cough     C/o cough, congestion, nausea, diarrhea, body aches, and joint pain x1 week. Also blister-like rash to vaginal area for the past few days     The patient is a 27-year-old female who presents to the emergency department with URI symptoms malaise and painful lesions on her genitalia that she noticed today.  No other complaints.  The patient is very concerned because she recently entered into a relationship with a new partner.    Review of patient's allergies indicates:  No Known Allergies  Past Medical History:   Diagnosis Date    ADHD (attention deficit hyperactivity disorder)     History of chlamydia infection 2020    Vaccine for human papilloma virus (HPV) types 6, 11, 16, and 18 administered 2007, 2007, 2008    3 of 3 received      No past surgical history on file.  Family History   Problem Relation Age of Onset    No Known Problems Father     No Known Problems Mother     Melanoma Maternal Grandmother     Breast cancer Neg Hx     Colon cancer Neg Hx     Ovarian cancer Neg Hx      Social History     Tobacco Use    Smoking status: Never    Smokeless tobacco: Never   Substance Use Topics    Alcohol use: Yes     Comment: socially     Drug use: Never     Review of Systems   Constitutional:  Negative for activity change, fatigue and fever.   HENT:  Positive for congestion. Negative for sore throat.    Respiratory:  Negative for shortness of breath.    Cardiovascular:  Negative for chest pain.   Gastrointestinal:  Negative for nausea.   Genitourinary:  Negative for dysuria.   Musculoskeletal:  Negative for back pain.   Skin:  Negative for rash.   Neurological:  Negative for weakness.   Hematological:  Does not bruise/bleed easily.     Physical Exam     Initial Vitals [11/28/22 1747]   BP Pulse Resp Temp SpO2   (!) 158/96 104 20 99.4 °F (37.4 °C) 98 %      MAP       --         Physical Exam    Nursing note and vitals  reviewed.  Constitutional: She appears well-developed and well-nourished.   HENT:   Head: Normocephalic and atraumatic.   Neck: Neck supple.   Normal range of motion.  Pulmonary/Chest: Breath sounds normal.   Abdominal: Abdomen is soft. There is no abdominal tenderness.   Genitourinary:    No vaginal discharge.      Genitourinary Comments: Several ulcerative painful lesions to her vaginal introitus at 6 o'clock and around her buttocks.     Musculoskeletal:         General: Normal range of motion.      Cervical back: Normal range of motion and neck supple.     Neurological: She is alert and oriented to person, place, and time.   Skin: Skin is warm and dry.   Psychiatric: She has a normal mood and affect. Her behavior is normal. Judgment and thought content normal.       ED Course   Procedures  Labs Reviewed   HIV 1 / 2 ANTIBODY   HEPATITIS C ANTIBODY   HERPES SIMPLEX (HSV) BY RAPID PCR, NON-BLOOD   POCT INFLUENZA A/B MOLECULAR          Imaging Results    None          Medications - No data to display  Medical Decision Making:   ED Management:  Lesions on her genitalia are consistent with herpes genitalia.  She will be started on Valtrex.  The herpes viral culture was sent from the ulceration.                        Clinical Impression:   Final diagnoses:  [A60.09] Herpes simplex of female genitalia (Primary)        ED Disposition Condition    Discharge Stable          ED Prescriptions       Medication Sig Dispense Start Date End Date Auth. Provider    valACYclovir (VALTREX) 1000 MG tablet Take 1 tablet (1,000 mg total) by mouth every 12 (twelve) hours. for 10 days 20 tablet 11/28/2022 12/8/2022 America Olivas MD          Follow-up Information       Follow up With Specialties Details Why Contact Info    Lary Elias MD Gynecology, Obstetrics, Obstetrics and Gynecology Schedule an appointment as soon as possible for a visit   [address] [facility]  [address] [place] [id number]  [phone]               America  MD Deisy  11/28/22 7732

## 2022-11-29 NOTE — ED NOTES
LOC: The patient is awake, alert, and oriented to self, place, time, and situation. Pt is calm and cooperative. Affect is appropriate.  Speech is appropriate and clear.     APPEARANCE: Patient sitting in recliner in no acute distress.  Patient is clean and well groomed.    SKIN: The skin is warm and dry; color consistent with ethnicity.  Patient has normal skin turgor and moist mucus membranes.  Skin intact with the exception of reported blisters to the vaginal area; no additional breakdown or bruising noted.     MUSCULOSKELETAL: Patient moving upper and lower extremities without difficulty; reports generalized body aches and joint pain.  Denies weakness.     RESPIRATORY: Airway is open and patent. Respirations spontaneous, even, easy, and non-labored.  Patient has a normal effort and rate.  No accessory muscle use noted. Pt reports cough and congestion.     CARDIAC:  Normal rate noted.; pt is hypertensive.  No peripheral edema noted. No complaints of chest pain.     ABDOMEN: Soft and non tender to palpation.  No distention noted. Pt denies abdominal pain; reports nausea and diarrhea; denies vomiting or constipation.    NEUROLOGIC: Eyes open spontaneously.  Behavior appropriate to situation.  Follows commands; facial expression symmetrical.  Purposeful motor response noted; normal sensation in all extremities. Pt denies headache; denies lightheadedness or dizziness; denies visual disturbances; denies loss of balance; denies unilateral weakness.

## 2022-11-29 NOTE — PROGRESS NOTES
"Subjective:       Patient ID: Noreen Olivas is a 27 y.o. female.    Chief Complaint:  Annual Exam (Last pap/hpv: 9/2021 Normal)      History of Present Illness  - here for annual. Has felt sick all week; flu-like. Felt pain yesterday and saw blisters near her vaginal opening. Went to ED and was told likely has HSV. Patient was started on Valtrex.   - Has same partner as last year. HSV IgG was negative last year.    Past Medical History:   Diagnosis Date    ADHD (attention deficit hyperactivity disorder)     History of chlamydia infection 2020    Vaccine for human papilloma virus (HPV) types 6, 11, 16, and 18 administered 2007, 2007, 2008    3 of 3 received        History reviewed. No pertinent surgical history.     Family History   Problem Relation Age of Onset    No Known Problems Father     No Known Problems Mother     Melanoma Maternal Grandmother     Breast cancer Neg Hx     Colon cancer Neg Hx     Ovarian cancer Neg Hx         Social History     Socioeconomic History    Marital status: Single   Tobacco Use    Smoking status: Never    Smokeless tobacco: Never   Substance and Sexual Activity    Alcohol use: Yes     Comment: socially     Drug use: Never    Sexual activity: Yes     Partners: Male     Birth control/protection: OCP, None           Objective:     Vitals:    11/29/22 1011   BP: 124/88   Weight: 80.7 kg (177 lb 12.8 oz)   Height: 5' 2" (1.575 m)       Physical Exam:   Constitutional: She is oriented to person, place, and time. She appears well-developed and well-nourished.        Pulmonary/Chest: Right breast exhibits no mass, no nipple discharge, no skin change, no tenderness and no swelling. Left breast exhibits no mass, no nipple discharge, no skin change, no tenderness and no swelling. Breasts are symmetrical.        Abdominal: Soft. She exhibits no distension. There is no abdominal tenderness.     Genitourinary:    Uterus normal.         There is no tenderness or lesion on the right labia. " There is no tenderness or lesion on the left labia. Cervix is normal. Right adnexum displays no mass, no tenderness and no fullness. Left adnexum displays no mass, no tenderness and no fullness. No  no vaginal discharge in the vagina.    pap smear completed   Genitourinary Comments: Grouped ulcers on an erythematous base c/w HSV. Culture done.             Musculoskeletal: Moves all extremeties.       Neurological: She is alert and oriented to person, place, and time.     Psychiatric: She has a normal mood and affect.      Assessment/ Plan:     Orders Placed This Encounter    C. trachomatis/N. gonorrhoeae by AMP DNA [facility]; Urine    Hepatitis C Antibody    HIV 1/2 Ag/Ab (4th Gen)    RPR    Hepatitis B Surface Antigen    HSV 1 & 2, IgG    HSV by Rapid PCR, Non-Blood [facility]; Vaginal/Rectal    Liquid-Based Pap Smear, Screening       Noreen was seen today for annual exam.    Diagnoses and all orders for this visit:    Encounter for gynecological examination    Screening for STD (sexually transmitted disease)  -     Hepatitis C Antibody; Future  -     HIV 1/2 Ag/Ab (4th Gen); Future  -     RPR; Future  -     C. trachomatis/N. gonorrhoeae by AMP DNA [facility]; Urine  -     Hepatitis B Surface Antigen; Future  -     Cancel: Herpes simplex Virus (HSV) Type 1 & 2 DNA by PCR; Future  -     HSV 1 & 2, IgG; Future  -     HSV by Rapid PCR, Non-Blood [facility]; Vaginal/Rectal; Future  -     HSV by Rapid PCR, Non-Blood [facility]; Vaginal/Rectal    Screening for cervical cancer  -     Liquid-Based Pap Smear, Screening    Vaginal lesion  -     HSV by Rapid PCR, Non-Blood [facility]; Vaginal/Rectal; Future  -     HSV by Rapid PCR, Non-Blood [facility]; Vaginal/Rectal    - agree with likely diagnosis of HSV. Will follow labs closely. Patient will continue Valtrex and call me for anything.  - once is feeling better can discuss management moving forward.    Follow up in about 1 year (around 11/29/2023) for annual exam.    As of April 1, 2021, the  Cures Act has been passed nationally. This new law requires that all doctors progress notes, lab results, pathology reports and radiology reports be released IMMEDIATELY to the patient in the patient portal. That means that the results are released to you at the EXACT same time they are released to me. Therefore, with all of the patients that I have I am not able to reply to each patient exactly when the results come in. So there will be a delay from when you see the results to when I see them and have time to come up with a response to send you. Also I only see these results when I am on the computer at work. So if the results come in over the weekend or after 5 pm of a work day, I will not see them until the next business day. As you can tell, this is a challenge as a physician to give every patient the quick response they hope for and deserve. So please be patient!   Thanks for your understanding and patience.

## 2022-12-01 ENCOUNTER — PATIENT MESSAGE (OUTPATIENT)
Dept: OBSTETRICS AND GYNECOLOGY | Facility: CLINIC | Age: 27
End: 2022-12-01
Payer: COMMERCIAL

## 2022-12-01 LAB
C TRACH DNA SPEC QL NAA+PROBE: NOT DETECTED
HSV1 DNA SPEC QL NAA+PROBE: NEGATIVE
HSV2 DNA SPEC QL NAA+PROBE: POSITIVE
N GONORRHOEA DNA SPEC QL NAA+PROBE: NOT DETECTED
SPECIMEN SOURCE: ABNORMAL

## 2022-12-02 ENCOUNTER — PATIENT MESSAGE (OUTPATIENT)
Dept: OBSTETRICS AND GYNECOLOGY | Facility: CLINIC | Age: 27
End: 2022-12-02
Payer: COMMERCIAL

## 2022-12-02 LAB
HSV1 DNA SPEC QL NAA+PROBE: NEGATIVE
HSV1 GG IGG SER-ACNC: 0.09 IV
HSV2 DNA SPEC QL NAA+PROBE: POSITIVE
HSV2 GG IGG SER-ACNC: 0.26 IV
SPECIMEN SOURCE: ABNORMAL

## 2022-12-05 RX ORDER — VALACYCLOVIR HYDROCHLORIDE 500 MG/1
500 TABLET, FILM COATED ORAL DAILY
Qty: 90 TABLET | Refills: 3 | Status: SHIPPED | OUTPATIENT
Start: 2022-12-05 | End: 2023-12-06

## 2022-12-05 NOTE — TELEPHONE ENCOUNTER
Spoke with patient. Discussed results. Partner has never had an outbreak. He's likely never been tested for HSV. He will see a doctor for labs.    Discussed daily Valtrex prophylaxis vs episodic only. Patient would like daily med. Is aware to take bid x 3 days if feels an outbreak coming. She'll keep me posted.    Periods are irregular. Has had unprotected sex and no pregnancy scares. Discussed how labs can give us some guidance but they're just a snapshot. If she's not ready for pregnancy, I would probably defer. Discussed history of chlamydia. Would have low threshold for ordering HSG. Discussed considering using an opk to avoid intercourse or protect during ovulation. Patient verbalized understanding.

## 2022-12-06 LAB
FINAL PATHOLOGIC DIAGNOSIS: NORMAL
Lab: NORMAL

## 2023-11-27 ENCOUNTER — ON-DEMAND VIRTUAL (OUTPATIENT)
Dept: URGENT CARE | Facility: CLINIC | Age: 28
End: 2023-11-27
Payer: COMMERCIAL

## 2023-11-27 DIAGNOSIS — J06.9 UPPER RESPIRATORY TRACT INFECTION, UNSPECIFIED TYPE: Primary | ICD-10-CM

## 2023-11-27 PROCEDURE — 99212 PR OFFICE/OUTPT VISIT, EST, LEVL II, 10-19 MIN: ICD-10-PCS | Mod: 95,,, | Performed by: INTERNAL MEDICINE

## 2023-11-27 PROCEDURE — 99212 OFFICE O/P EST SF 10 MIN: CPT | Mod: 95,,, | Performed by: INTERNAL MEDICINE

## 2023-11-27 NOTE — LETTER
November 27, 2023      Virtual Visit - Urgent Care  [address] [facility] [id number]       Patient: Noreen Olivas   YOB: 1995  Date of Visit: 11/27/2023    To Whom It May Concern:    Jenna Olivas  was at [facility] on 11/27/2023. The patient may return to work/school on 11/28/2023 with no restrictions. If you have any questions or concerns, or if I can be of further assistance, please do not hesitate to contact me.    Sincerely,    RASHIDA Infante Jr., MD

## 2023-11-27 NOTE — PROGRESS NOTES
Subjective:      Patient ID: Noreen Olivas is a 28 y.o. female.    Vitals:  vitals were not taken for this visit.     Chief Complaint: Sinus Problem      Visit Type: TELE AUDIOVISUAL    Present with the patient at the time of consultation: TELEMED PRESENT WITH PATIENT: None    Past Medical History:   Diagnosis Date    ADHD (attention deficit hyperactivity disorder)     History of chlamydia infection 2020    Vaccine for human papilloma virus (HPV) types 6, 11, 16, and 18 administered 2007, 2007, 2008    3 of 3 received      History reviewed. No pertinent surgical history.  Review of patient's allergies indicates:  No Known Allergies  Current Outpatient Medications on File Prior to Visit   Medication Sig Dispense Refill    ALPRAZolam (XANAX) 0.5 MG tablet Take 0.5 mg by mouth nightly.      IBUPROFEN ORAL Take by mouth.      naproxen sodium (ANAPROX) 550 MG tablet Take 1 tablet (550 mg total) by mouth 3 (three) times daily with meals. (Patient not taking: Reported on 11/29/2022) 30 tablet 0    valACYclovir (VALTREX) 500 MG tablet Take 1 tablet (500 mg total) by mouth once daily. 90 tablet 3     No current facility-administered medications on file prior to visit.     Family History   Problem Relation Age of Onset    No Known Problems Father     No Known Problems Mother     Melanoma Maternal Grandmother     Breast cancer Neg Hx     Colon cancer Neg Hx     Ovarian cancer Neg Hx            Ohs Peq Odvv Intake    11/27/2023  1:37 PM CST - Filed by Patient   Describe your reason for todays visit sinus pressure   What is your current physical address in the event of a medical emergency? [address] [facility] [address]   Are you able to take your vital signs? Yes   Systolic Blood Pressure:    Diastolic Blood Pressure:    Weight: 185   Height: 61   Pulse: 68   Temperature: 95.9   Respiration rate:    Pulse Oxygen:    Please attach any relevant images or files          In [place] via video conference.     29 y/o  woman with a history of sinus congestion and sinus pressure with cough for 4 days. No fever. She stayed home from work today.     Sinus Problem  Associated symptoms include coughing and sinus pressure.       Constitution: Negative for fever.   HENT:  Positive for postnasal drip, sinus pain and sinus pressure.    Respiratory:  Positive for cough. Negative for stridor.         Objective:   The physical exam was conducted virtually.  Physical Exam   Constitutional:  Non-toxic appearance. No distress.   HENT:   Nose: Rhinorrhea and congestion present.   Pulmonary/Chest: Effort normal. No stridor. No respiratory distress.         Comments: Normal verbal roberto without respiratory compromise.     Neurological: She is alert.       Assessment:     1. Upper respiratory tract infection, unspecified type        Plan:       Upper respiratory tract infection, unspecified type      This is most likely a viral upper respiratory infection like RSV. Since you have not had fever and your symptoms are more cough and congestion this is unlikely to be influenza. You may return to work tomorrow since you have not had fever and your symptoms have been going on since Friday. Note written for work.    If you worsen acutely, or you do not improve in a few days go in for an in person evaluation.

## 2023-11-30 ENCOUNTER — OFFICE VISIT (OUTPATIENT)
Dept: URGENT CARE | Facility: CLINIC | Age: 28
End: 2023-11-30
Payer: COMMERCIAL

## 2023-11-30 VITALS
SYSTOLIC BLOOD PRESSURE: 137 MMHG | BODY MASS INDEX: 32.57 KG/M2 | WEIGHT: 177 LBS | OXYGEN SATURATION: 96 % | RESPIRATION RATE: 18 BRPM | DIASTOLIC BLOOD PRESSURE: 87 MMHG | HEIGHT: 62 IN | HEART RATE: 77 BPM | TEMPERATURE: 98 F

## 2023-11-30 DIAGNOSIS — B96.89 ACUTE BACTERIAL SINUSITIS: Primary | ICD-10-CM

## 2023-11-30 DIAGNOSIS — J01.90 ACUTE BACTERIAL SINUSITIS: Primary | ICD-10-CM

## 2023-11-30 DIAGNOSIS — J34.89 SINUS PRESSURE: ICD-10-CM

## 2023-11-30 PROCEDURE — 99213 OFFICE O/P EST LOW 20 MIN: CPT | Mod: S$GLB,,, | Performed by: NURSE PRACTITIONER

## 2023-11-30 PROCEDURE — 99213 PR OFFICE/OUTPT VISIT, EST, LEVL III, 20-29 MIN: ICD-10-PCS | Mod: S$GLB,,, | Performed by: NURSE PRACTITIONER

## 2023-11-30 RX ORDER — METHYLPREDNISOLONE 4 MG/1
TABLET ORAL
Qty: 21 EACH | Refills: 0 | Status: SHIPPED | OUTPATIENT
Start: 2023-11-30 | End: 2023-12-06

## 2023-11-30 RX ORDER — AMOXICILLIN AND CLAVULANATE POTASSIUM 875; 125 MG/1; MG/1
1 TABLET, FILM COATED ORAL 2 TIMES DAILY
Qty: 20 TABLET | Refills: 0 | Status: SHIPPED | OUTPATIENT
Start: 2023-11-30 | End: 2023-12-06

## 2023-11-30 NOTE — PROGRESS NOTES
"Subjective:      Patient ID: Noreen Olivas is a 28 y.o. female.    Vitals:  height is 5' 2" (1.575 m) and weight is 80.3 kg (177 lb). Her oral temperature is 97.9 °F (36.6 °C). Her blood pressure is 137/87 and her pulse is 77. Her respiration is 18 and oxygen saturation is 96%.     Chief Complaint: Sinus Problem    Pt states she has been having sinus pain that started over 5 days ago, she also states she is congested with eye pain, ear pain, and teeth pain. Mucinex, dayquil, and nyquil were taken and provided mild relief.  She has tried saline rinses as well with no relief.  States that she did a virtual two days ago and they told her it was likely viral and to use supportive care.  She notes that it's getting worse and nothing is helping and that she has missed two days of work.  Offered work note, she denies needing this.  She is requesting steroids as well.    Sinus Problem  This is a new problem. Episode onset: 5 days ago. The problem has been gradually worsening since onset. There has been no fever. Her pain is at a severity of 8/10. The pain is moderate. Associated symptoms include congestion, ear pain, headaches and sinus pressure. Pertinent negatives include no chills, coughing, diaphoresis, hoarse voice, neck pain, shortness of breath, sneezing, sore throat or swollen glands. (nauseous) Treatments tried: mucinex, nyquil, dayquil. The treatment provided mild relief.       Constitution: Negative for chills, sweating, fatigue and fever.   HENT:  Positive for ear pain, dental problem (dental pain to right upper side), congestion, sinus pain and sinus pressure. Negative for postnasal drip, sore throat, trouble swallowing and voice change.    Neck: Negative for neck pain and neck stiffness.   Eyes:  Negative for eye pain.   Respiratory:  Negative for cough and shortness of breath.    Allergic/Immunologic: Negative for sneezing.   Neurological:  Positive for headaches.      Objective:     Physical Exam "   Constitutional: She is oriented to person, place, and time. She appears well-developed. She is cooperative.  Non-toxic appearance. She does not appear ill. No distress.   HENT:   Head: Normocephalic and atraumatic.   Ears:   Right Ear: Hearing, external ear and ear canal normal. Tympanic membrane is erythematous. A middle ear effusion (serous) is present.   Left Ear: Hearing, tympanic membrane, external ear and ear canal normal.   Nose: Purulent discharge and sinus tenderness present. No mucosal edema, rhinorrhea or nasal deformity. No epistaxis. Right sinus exhibits maxillary sinus tenderness. Right sinus exhibits no frontal sinus tenderness. Left sinus exhibits no maxillary sinus tenderness and no frontal sinus tenderness.   Mouth/Throat: Uvula is midline, oropharynx is clear and moist and mucous membranes are normal. No trismus in the jaw. Normal dentition. No uvula swelling. No oropharyngeal exudate, posterior oropharyngeal edema or posterior oropharyngeal erythema.   Eyes: Conjunctivae and lids are normal. No scleral icterus.   Neck: Trachea normal and phonation normal. Neck supple. No edema present. No erythema present. No neck rigidity present.   Cardiovascular: Normal rate, regular rhythm, normal heart sounds and normal pulses.   Pulmonary/Chest: Effort normal and breath sounds normal. No respiratory distress. She has no decreased breath sounds. She has no rhonchi.   Abdominal: Normal appearance.   Musculoskeletal: Normal range of motion.         General: No deformity. Normal range of motion.   Neurological: She is alert and oriented to person, place, and time. She exhibits normal muscle tone. Coordination normal.   Skin: Skin is warm, dry, intact, not diaphoretic and not pale.   Psychiatric: Her speech is normal and behavior is normal. Judgment and thought content normal.   Nursing note and vitals reviewed.      Assessment:     1. Acute bacterial sinusitis    2. Sinus pressure        Plan:     Over 5 days  of symptoms will refrain from covid 19 testing at this time given time frame.  Acute bacterial sinusitis  -     amoxicillin-clavulanate 875-125mg (AUGMENTIN) 875-125 mg per tablet; Take 1 tablet by mouth 2 (two) times daily. for 10 days  Dispense: 20 tablet; Refill: 0    Sinus pressure  -     methylPREDNISolone (MEDROL DOSEPACK) 4 mg tablet; use as directed  Dispense: 21 each; Refill: 0      Patient Instructions   Please drink plenty of fluids.  Please get plenty of rest.  Please return here or go to the Emergency Department for any concerns or worsening of condition.  If you were prescribed antibiotics, please take them to completion.  If you do not have Hypertension or any history of palpitations, it is ok to take over the counter Sudafed or Mucinex D or Allegra-D or Claritin-D or Zyrtec-D.  If you do take one of the above, it is ok to combine that with plain over the counter Mucinex or Allegra or Claritin or Zyrtec.  If for example you are taking Zyrtec -D, you can combine that with Mucinex, but not Mucinex-D.  If you are taking Mucinex-D, you can combine that with plain Allegra or Claritin or Zyrtec.   If you do have Hypertension or palpitations, it is safe to take Coricidin HBP for relief of sinus symptoms.  We recommend you take over the counter Flonase (Fluticasone) or another nasally inhaled steroid unless you are already taking one.  Nasal irrigation with a saline spray or Netti Pot like device per their directions is also recommended.  If not allergic, please take over the counter Tylenol (Acetaminophen) and/or Motrin (Ibuprofen) as directed for control of pain and/or fever.  Please follow up with your primary care doctor or specialist as needed.    If you  smoke, please stop smoking.

## 2023-12-01 ENCOUNTER — OFFICE VISIT (OUTPATIENT)
Dept: INTERNAL MEDICINE | Facility: CLINIC | Age: 28
End: 2023-12-01
Payer: COMMERCIAL

## 2023-12-01 ENCOUNTER — TELEPHONE (OUTPATIENT)
Dept: ADMINISTRATIVE | Facility: OTHER | Age: 28
End: 2023-12-01
Payer: COMMERCIAL

## 2023-12-01 ENCOUNTER — LAB VISIT (OUTPATIENT)
Dept: LAB | Facility: OTHER | Age: 28
End: 2023-12-01
Attending: STUDENT IN AN ORGANIZED HEALTH CARE EDUCATION/TRAINING PROGRAM
Payer: COMMERCIAL

## 2023-12-01 VITALS
SYSTOLIC BLOOD PRESSURE: 120 MMHG | DIASTOLIC BLOOD PRESSURE: 74 MMHG | WEIGHT: 188.5 LBS | HEART RATE: 95 BPM | OXYGEN SATURATION: 98 % | HEIGHT: 62 IN | BODY MASS INDEX: 34.69 KG/M2

## 2023-12-01 DIAGNOSIS — E66.9 OBESITY (BMI 30.0-34.9): ICD-10-CM

## 2023-12-01 DIAGNOSIS — Z00.00 ANNUAL PHYSICAL EXAM: Primary | ICD-10-CM

## 2023-12-01 DIAGNOSIS — R63.4 WEIGHT LOSS: ICD-10-CM

## 2023-12-01 DIAGNOSIS — F41.9 ANXIETY: ICD-10-CM

## 2023-12-01 DIAGNOSIS — J32.9 SINUSITIS, UNSPECIFIED CHRONICITY, UNSPECIFIED LOCATION: ICD-10-CM

## 2023-12-01 LAB
ALBUMIN SERPL BCP-MCNC: 4.1 G/DL (ref 3.5–5.2)
ALP SERPL-CCNC: 67 U/L (ref 55–135)
ALT SERPL W/O P-5'-P-CCNC: 29 U/L (ref 10–44)
ANION GAP SERPL CALC-SCNC: 11 MMOL/L (ref 8–16)
AST SERPL-CCNC: 20 U/L (ref 10–40)
BASOPHILS # BLD AUTO: 0.02 K/UL (ref 0–0.2)
BASOPHILS NFR BLD: 0.2 % (ref 0–1.9)
BILIRUB SERPL-MCNC: 0.5 MG/DL (ref 0.1–1)
BUN SERPL-MCNC: 12 MG/DL (ref 6–20)
CALCIUM SERPL-MCNC: 9.7 MG/DL (ref 8.7–10.5)
CHLORIDE SERPL-SCNC: 103 MMOL/L (ref 95–110)
CHOLEST SERPL-MCNC: 176 MG/DL (ref 120–199)
CHOLEST/HDLC SERPL: 2.7 {RATIO} (ref 2–5)
CO2 SERPL-SCNC: 25 MMOL/L (ref 23–29)
CREAT SERPL-MCNC: 0.8 MG/DL (ref 0.5–1.4)
DIFFERENTIAL METHOD: ABNORMAL
EOSINOPHIL # BLD AUTO: 0 K/UL (ref 0–0.5)
EOSINOPHIL NFR BLD: 0.1 % (ref 0–8)
ERYTHROCYTE [DISTWIDTH] IN BLOOD BY AUTOMATED COUNT: 13.2 % (ref 11.5–14.5)
EST. GFR  (NO RACE VARIABLE): >60 ML/MIN/1.73 M^2
ESTIMATED AVG GLUCOSE: 88 MG/DL (ref 68–131)
GLUCOSE SERPL-MCNC: 100 MG/DL (ref 70–110)
HBA1C MFR BLD: 4.7 % (ref 4–5.6)
HCT VFR BLD AUTO: 39.3 % (ref 37–48.5)
HDLC SERPL-MCNC: 66 MG/DL (ref 40–75)
HDLC SERPL: 37.5 % (ref 20–50)
HGB BLD-MCNC: 12.4 G/DL (ref 12–16)
IMM GRANULOCYTES # BLD AUTO: 0.04 K/UL (ref 0–0.04)
IMM GRANULOCYTES NFR BLD AUTO: 0.4 % (ref 0–0.5)
LDLC SERPL CALC-MCNC: 90.2 MG/DL (ref 63–159)
LYMPHOCYTES # BLD AUTO: 2.3 K/UL (ref 1–4.8)
LYMPHOCYTES NFR BLD: 20.6 % (ref 18–48)
MCH RBC QN AUTO: 30.7 PG (ref 27–31)
MCHC RBC AUTO-ENTMCNC: 31.6 G/DL (ref 32–36)
MCV RBC AUTO: 97 FL (ref 82–98)
MONOCYTES # BLD AUTO: 0.7 K/UL (ref 0.3–1)
MONOCYTES NFR BLD: 6.7 % (ref 4–15)
NEUTROPHILS # BLD AUTO: 8 K/UL (ref 1.8–7.7)
NEUTROPHILS NFR BLD: 72 % (ref 38–73)
NONHDLC SERPL-MCNC: 110 MG/DL
NRBC BLD-RTO: 0 /100 WBC
PLATELET # BLD AUTO: 242 K/UL (ref 150–450)
PMV BLD AUTO: 9.7 FL (ref 9.2–12.9)
POTASSIUM SERPL-SCNC: 3.9 MMOL/L (ref 3.5–5.1)
PROT SERPL-MCNC: 8.7 G/DL (ref 6–8.4)
RBC # BLD AUTO: 4.04 M/UL (ref 4–5.4)
SODIUM SERPL-SCNC: 139 MMOL/L (ref 136–145)
TRIGL SERPL-MCNC: 99 MG/DL (ref 30–150)
TSH SERPL DL<=0.005 MIU/L-ACNC: 1.98 UIU/ML (ref 0.4–4)
WBC # BLD AUTO: 11.03 K/UL (ref 3.9–12.7)

## 2023-12-01 PROCEDURE — 1160F RVW MEDS BY RX/DR IN RCRD: CPT | Mod: CPTII,S$GLB,, | Performed by: STUDENT IN AN ORGANIZED HEALTH CARE EDUCATION/TRAINING PROGRAM

## 2023-12-01 PROCEDURE — 3008F PR BODY MASS INDEX (BMI) DOCUMENTED: ICD-10-PCS | Mod: CPTII,S$GLB,, | Performed by: STUDENT IN AN ORGANIZED HEALTH CARE EDUCATION/TRAINING PROGRAM

## 2023-12-01 PROCEDURE — 99999 PR PBB SHADOW E&M-EST. PATIENT-LVL IV: CPT | Mod: PBBFAC,,, | Performed by: STUDENT IN AN ORGANIZED HEALTH CARE EDUCATION/TRAINING PROGRAM

## 2023-12-01 PROCEDURE — 80053 COMPREHEN METABOLIC PANEL: CPT | Performed by: STUDENT IN AN ORGANIZED HEALTH CARE EDUCATION/TRAINING PROGRAM

## 2023-12-01 PROCEDURE — 1159F PR MEDICATION LIST DOCUMENTED IN MEDICAL RECORD: ICD-10-PCS | Mod: CPTII,S$GLB,, | Performed by: STUDENT IN AN ORGANIZED HEALTH CARE EDUCATION/TRAINING PROGRAM

## 2023-12-01 PROCEDURE — 1160F PR REVIEW ALL MEDS BY PRESCRIBER/CLIN PHARMACIST DOCUMENTED: ICD-10-PCS | Mod: CPTII,S$GLB,, | Performed by: STUDENT IN AN ORGANIZED HEALTH CARE EDUCATION/TRAINING PROGRAM

## 2023-12-01 PROCEDURE — 85025 COMPLETE CBC W/AUTO DIFF WBC: CPT | Performed by: STUDENT IN AN ORGANIZED HEALTH CARE EDUCATION/TRAINING PROGRAM

## 2023-12-01 PROCEDURE — 99999 PR PBB SHADOW E&M-EST. PATIENT-LVL IV: ICD-10-PCS | Mod: PBBFAC,,, | Performed by: STUDENT IN AN ORGANIZED HEALTH CARE EDUCATION/TRAINING PROGRAM

## 2023-12-01 PROCEDURE — 3074F SYST BP LT 130 MM HG: CPT | Mod: CPTII,S$GLB,, | Performed by: STUDENT IN AN ORGANIZED HEALTH CARE EDUCATION/TRAINING PROGRAM

## 2023-12-01 PROCEDURE — 3074F PR MOST RECENT SYSTOLIC BLOOD PRESSURE < 130 MM HG: ICD-10-PCS | Mod: CPTII,S$GLB,, | Performed by: STUDENT IN AN ORGANIZED HEALTH CARE EDUCATION/TRAINING PROGRAM

## 2023-12-01 PROCEDURE — 1159F MED LIST DOCD IN RCRD: CPT | Mod: CPTII,S$GLB,, | Performed by: STUDENT IN AN ORGANIZED HEALTH CARE EDUCATION/TRAINING PROGRAM

## 2023-12-01 PROCEDURE — 84443 ASSAY THYROID STIM HORMONE: CPT | Performed by: STUDENT IN AN ORGANIZED HEALTH CARE EDUCATION/TRAINING PROGRAM

## 2023-12-01 PROCEDURE — 3078F DIAST BP <80 MM HG: CPT | Mod: CPTII,S$GLB,, | Performed by: STUDENT IN AN ORGANIZED HEALTH CARE EDUCATION/TRAINING PROGRAM

## 2023-12-01 PROCEDURE — 99203 PR OFFICE/OUTPT VISIT, NEW, LEVL III, 30-44 MIN: ICD-10-PCS | Mod: S$GLB,,, | Performed by: STUDENT IN AN ORGANIZED HEALTH CARE EDUCATION/TRAINING PROGRAM

## 2023-12-01 PROCEDURE — 36415 COLL VENOUS BLD VENIPUNCTURE: CPT | Performed by: STUDENT IN AN ORGANIZED HEALTH CARE EDUCATION/TRAINING PROGRAM

## 2023-12-01 PROCEDURE — 3078F PR MOST RECENT DIASTOLIC BLOOD PRESSURE < 80 MM HG: ICD-10-PCS | Mod: CPTII,S$GLB,, | Performed by: STUDENT IN AN ORGANIZED HEALTH CARE EDUCATION/TRAINING PROGRAM

## 2023-12-01 PROCEDURE — 80061 LIPID PANEL: CPT | Performed by: STUDENT IN AN ORGANIZED HEALTH CARE EDUCATION/TRAINING PROGRAM

## 2023-12-01 PROCEDURE — 3008F BODY MASS INDEX DOCD: CPT | Mod: CPTII,S$GLB,, | Performed by: STUDENT IN AN ORGANIZED HEALTH CARE EDUCATION/TRAINING PROGRAM

## 2023-12-01 PROCEDURE — 99203 OFFICE O/P NEW LOW 30 MIN: CPT | Mod: S$GLB,,, | Performed by: STUDENT IN AN ORGANIZED HEALTH CARE EDUCATION/TRAINING PROGRAM

## 2023-12-01 PROCEDURE — 83036 HEMOGLOBIN GLYCOSYLATED A1C: CPT | Performed by: STUDENT IN AN ORGANIZED HEALTH CARE EDUCATION/TRAINING PROGRAM

## 2023-12-01 RX ORDER — ALPRAZOLAM 0.5 MG/1
0.5 TABLET ORAL NIGHTLY
Qty: 30 TABLET | Refills: 0 | Status: CANCELLED | OUTPATIENT
Start: 2023-12-01

## 2023-12-01 NOTE — PROGRESS NOTES
Subjective:       Patient ID: Noreen Olivas is a 28 y.o. female.    Chief Complaint: Establish Care, Annual Exam, and Weight Loss    HPI  Check up. Seen 1 day ago for sinusitis, just started on augmentin and medrol dose pack, so symptoms have been about the same, but will f/u if persistent..    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 Diet is ok, fluctuates, it is healthy for most part. Weekly exercise includes a walk once per day for 15-30 mins. Nonsmoker, no illicit drugs. Denies alcohol.     Requesting screening labs, so she can understand how her health is overall.     She is established with Ob/gyn for woman's health, sees Dr. Elias. Up to date on PAP: yes. Takes valtrex prn for genital hsv. Takes naproxen prn for menstual cramps.     Was previously Rx Xanax prn sleep and panic attacks and anxiety. She had tried to establish with therapy and psychiatry but did not find these helpful.     All of your core healthy metrics are met.      Social History     Social History Narrative    Not on file       Family History   Problem Relation Age of Onset    No Known Problems Father     No Known Problems Mother     Melanoma Maternal Grandmother     Breast cancer Neg Hx     Colon cancer Neg Hx     Ovarian cancer Neg Hx        Current Outpatient Medications:     ALPRAZolam (XANAX) 0.5 MG tablet, Take 0.5 mg by mouth nightly., Disp: , Rfl:     amoxicillin-clavulanate 875-125mg (AUGMENTIN) 875-125 mg per tablet, Take 1 tablet by mouth 2 (two) times daily. for 10 days, Disp: 20 tablet, Rfl: 0    methylPREDNISolone (MEDROL DOSEPACK) 4 mg tablet, use as directed, Disp: 21 each, Rfl: 0    naproxen sodium (ANAPROX) 550 MG tablet, Take 1 tablet (550 mg total) by mouth 3 (three) times daily with meals., Disp: 30 tablet, Rfl: 0    valACYclovir (VALTREX) 500 MG tablet, Take 1 tablet (500 mg total) by mouth once daily. (Patient taking differently: Take 500 mg by mouth once daily. prn), Disp: 90 tablet, Rfl: 3    IBUPROFEN  "ORAL, Take by mouth., Disp: , Rfl:     Review of Systems   Constitutional:  Negative for chills and fever.   HENT:  Positive for congestion and sinus pressure.    Respiratory:  Negative for cough and shortness of breath.    Cardiovascular:  Negative for chest pain.   Gastrointestinal:  Negative for abdominal pain, nausea and vomiting.   Musculoskeletal:  Negative for gait problem.   Neurological:  Negative for weakness.   Psychiatric/Behavioral:  Negative for behavioral problems.        Objective:   /74   Pulse 95   Ht 5' 2" (1.575 m)   Wt 85.5 kg (188 lb 7.9 oz)   LMP 11/20/2023 (Approximate)   SpO2 98%   BMI 34.48 kg/m²      Physical Exam  Vitals and nursing note reviewed.   Constitutional:       General: She is not in acute distress.     Appearance: Normal appearance. She is not ill-appearing, toxic-appearing or diaphoretic.   Eyes:      General:         Right eye: No discharge.         Left eye: No discharge.      Conjunctiva/sclera: Conjunctivae normal.   Cardiovascular:      Rate and Rhythm: Normal rate and regular rhythm.   Pulmonary:      Effort: Pulmonary effort is normal. No respiratory distress.      Breath sounds: Normal breath sounds. No wheezing.   Abdominal:      Palpations: Abdomen is soft.      Tenderness: There is no abdominal tenderness. There is no guarding.   Neurological:      Mental Status: She is alert.   Psychiatric:         Behavior: Behavior normal.         Assessment & Plan   1. Annual physical exam  -screening labs below.    2. Anxiety  -mental health resource list provided to patient today.  - Will refer to psychiatry and help with establishing with new provider for her anxiety treatment, as I have discussed that I don't generally prescribe for long term management of anxiety.  - Ambulatory referral/consult to Psychiatry; Future    3. Weight loss  -she is interested in understanding medications for weight loss aid.  - Ambulatory referral/consult to Bariatric Medicine; " Future    4. Sinusitis, unspecified chronicity, unspecified location  -continue current therapies.     5. Obesity (BMI 30.0-34.9)  - CBC Auto Differential; Future  - Comprehensive Metabolic Panel; Future  - Hemoglobin A1C; Future  - Lipid Panel; Future  - TSH; Future    No follow-ups on file. She will f/u with new provider.    Disclaimer:  This note may have been prepared using voice recognition software, it may have not been extensively proofed, as such there could be errors within the text such as sound alike errors.

## 2023-12-01 NOTE — TELEPHONE ENCOUNTER
Unable to leave message to discuss scheduling Bariatrics referral. My Chart message to be sent for scheduling.

## 2023-12-04 ENCOUNTER — TELEPHONE (OUTPATIENT)
Dept: ADMINISTRATIVE | Facility: OTHER | Age: 28
End: 2023-12-04
Payer: COMMERCIAL

## 2023-12-04 NOTE — TELEPHONE ENCOUNTER
LM for patient to contact our scheduling office to discuss scheduling Bariatrics referral.Contact provided, and response to her portal message sent.

## 2023-12-06 ENCOUNTER — OFFICE VISIT (OUTPATIENT)
Dept: INTERNAL MEDICINE | Facility: CLINIC | Age: 28
End: 2023-12-06
Payer: COMMERCIAL

## 2023-12-06 VITALS
DIASTOLIC BLOOD PRESSURE: 71 MMHG | OXYGEN SATURATION: 98 % | BODY MASS INDEX: 34.48 KG/M2 | HEART RATE: 72 BPM | WEIGHT: 187.38 LBS | SYSTOLIC BLOOD PRESSURE: 130 MMHG | HEIGHT: 62 IN

## 2023-12-06 DIAGNOSIS — F41.1 GAD (GENERALIZED ANXIETY DISORDER): Primary | ICD-10-CM

## 2023-12-06 DIAGNOSIS — E66.9 CLASS 1 OBESITY WITHOUT SERIOUS COMORBIDITY WITH BODY MASS INDEX (BMI) OF 34.0 TO 34.9 IN ADULT, UNSPECIFIED OBESITY TYPE: ICD-10-CM

## 2023-12-06 PROBLEM — E66.811 CLASS 1 OBESITY WITH BODY MASS INDEX (BMI) OF 34.0 TO 34.9 IN ADULT: Status: ACTIVE | Noted: 2023-12-06

## 2023-12-06 PROCEDURE — 99999 PR PBB SHADOW E&M-EST. PATIENT-LVL III: CPT | Mod: PBBFAC,,, | Performed by: FAMILY MEDICINE

## 2023-12-06 PROCEDURE — 3008F PR BODY MASS INDEX (BMI) DOCUMENTED: ICD-10-PCS | Mod: CPTII,S$GLB,, | Performed by: FAMILY MEDICINE

## 2023-12-06 PROCEDURE — 1159F MED LIST DOCD IN RCRD: CPT | Mod: CPTII,S$GLB,, | Performed by: FAMILY MEDICINE

## 2023-12-06 PROCEDURE — 99999 PR PBB SHADOW E&M-EST. PATIENT-LVL III: ICD-10-PCS | Mod: PBBFAC,,, | Performed by: FAMILY MEDICINE

## 2023-12-06 PROCEDURE — 99214 PR OFFICE/OUTPT VISIT, EST, LEVL IV, 30-39 MIN: ICD-10-PCS | Mod: S$GLB,,, | Performed by: FAMILY MEDICINE

## 2023-12-06 PROCEDURE — 3078F PR MOST RECENT DIASTOLIC BLOOD PRESSURE < 80 MM HG: ICD-10-PCS | Mod: CPTII,S$GLB,, | Performed by: FAMILY MEDICINE

## 2023-12-06 PROCEDURE — 3075F PR MOST RECENT SYSTOLIC BLOOD PRESS GE 130-139MM HG: ICD-10-PCS | Mod: CPTII,S$GLB,, | Performed by: FAMILY MEDICINE

## 2023-12-06 PROCEDURE — 1159F PR MEDICATION LIST DOCUMENTED IN MEDICAL RECORD: ICD-10-PCS | Mod: CPTII,S$GLB,, | Performed by: FAMILY MEDICINE

## 2023-12-06 PROCEDURE — 99214 OFFICE O/P EST MOD 30 MIN: CPT | Mod: S$GLB,,, | Performed by: FAMILY MEDICINE

## 2023-12-06 PROCEDURE — 3044F HG A1C LEVEL LT 7.0%: CPT | Mod: CPTII,S$GLB,, | Performed by: FAMILY MEDICINE

## 2023-12-06 PROCEDURE — 3008F BODY MASS INDEX DOCD: CPT | Mod: CPTII,S$GLB,, | Performed by: FAMILY MEDICINE

## 2023-12-06 PROCEDURE — 3044F PR MOST RECENT HEMOGLOBIN A1C LEVEL <7.0%: ICD-10-PCS | Mod: CPTII,S$GLB,, | Performed by: FAMILY MEDICINE

## 2023-12-06 PROCEDURE — 3075F SYST BP GE 130 - 139MM HG: CPT | Mod: CPTII,S$GLB,, | Performed by: FAMILY MEDICINE

## 2023-12-06 PROCEDURE — 3078F DIAST BP <80 MM HG: CPT | Mod: CPTII,S$GLB,, | Performed by: FAMILY MEDICINE

## 2023-12-06 RX ORDER — ALPRAZOLAM 0.5 MG/1
0.5 TABLET ORAL NIGHTLY
Qty: 30 TABLET | Refills: 1 | Status: SHIPPED | OUTPATIENT
Start: 2023-12-06

## 2023-12-06 NOTE — PROGRESS NOTES
Subjective:       Patient ID: Noreen Olivas is a 28 y.o. female.    Chief Complaint: Establish Care    HPI  Came in today to establish care.  She has been having intermittent anxiety issues.  She lost her father to suicide during the early COVID 19 pandemic.  Was also having other life challenges including commuting from [place] and working 2 different jobs for a total of 80 hours per week.  Things have settled down recently and she was seen a therapist for a while but needs to reestablish with a new 1.  Usually would take may be 30 tablets of Xanax over a 3 month period.  She understands not to take this excessively and I discussed that if she needed the medication more frequently we may need to consider more of a foundation all baseline medication.  She feels like she is bad about taking daily pills so does not want to start a daily regimen at this time.  We reviewed her labs from last week and everything was within normal limits.    Briefly discussed weight loss and I counseled her on gradual improvement with diet and exercise.    All of your core healthy metrics are met.      Social History     Social History Narrative    Not on file       Family History   Problem Relation Age of Onset    No Known Problems Father     No Known Problems Mother     Melanoma Maternal Grandmother     Breast cancer Neg Hx     Colon cancer Neg Hx     Ovarian cancer Neg Hx        Current Outpatient Medications:     IBUPROFEN ORAL, Take by mouth., Disp: , Rfl:     naproxen sodium (ANAPROX) 550 MG tablet, Take 1 tablet (550 mg total) by mouth 3 (three) times daily with meals., Disp: 30 tablet, Rfl: 0    ALPRAZolam (XANAX) 0.5 MG tablet, Take 1 tablet (0.5 mg total) by mouth nightly., Disp: 30 tablet, Rfl: 1    Review of Systems   Constitutional:  Negative for chills and fever.   Respiratory:  Negative for cough and shortness of breath.    Cardiovascular:  Negative for chest pain.   Gastrointestinal:  Negative for abdominal pain.  "  Skin:  Negative for rash.   Neurological:  Negative for dizziness.       Objective:   /71 (BP Location: Left arm, Patient Position: Sitting)   Pulse 72   Ht 5' 2" (1.575 m)   Wt 85 kg (187 lb 6.3 oz)   LMP 11/20/2023 (Approximate)   SpO2 98%   BMI 34.27 kg/m²      Physical Exam  Vitals reviewed.   Constitutional:       Appearance: She is well-developed.   HENT:      Head: Normocephalic and atraumatic.   Eyes:      Conjunctiva/sclera: Conjunctivae normal.   Cardiovascular:      Rate and Rhythm: Normal rate.   Pulmonary:      Effort: Pulmonary effort is normal. No respiratory distress.   Skin:     General: Skin is warm and dry.      Findings: No rash.   Neurological:      Mental Status: She is alert and oriented to person, place, and time.      Coordination: Coordination normal.   Psychiatric:         Behavior: Behavior normal.         Assessment & Plan     Problem List Items Addressed This Visit          Psychiatric    SHAMAR (generalized anxiety disorder) - Primary    Current Assessment & Plan     Ok for intermittent xanax. Will continue to reassess need for something more.             Endocrine    Class 1 obesity with body mass index (BMI) of 34.0 to 34.9 in adult    Current Assessment & Plan     Counseled on importance on diet and exercise to decrease risk of comorbid conditions and for improved quality of life.                Immunizations Administered on Date of Encounter - 12/6/2023       No immunizations on file.             Follow up in about 6 months (around 6/6/2024).      Disclaimer:  This note may have been prepared using voice recognition software, it may have not been extensively proofed, as such there could be errors within the text such as sound alike errors.    "

## 2024-02-28 ENCOUNTER — LAB VISIT (OUTPATIENT)
Dept: LAB | Facility: HOSPITAL | Age: 29
End: 2024-02-28
Attending: STUDENT IN AN ORGANIZED HEALTH CARE EDUCATION/TRAINING PROGRAM
Payer: COMMERCIAL

## 2024-02-28 ENCOUNTER — OFFICE VISIT (OUTPATIENT)
Dept: OBSTETRICS AND GYNECOLOGY | Facility: CLINIC | Age: 29
End: 2024-02-28
Payer: COMMERCIAL

## 2024-02-28 VITALS
BODY MASS INDEX: 34.08 KG/M2 | DIASTOLIC BLOOD PRESSURE: 70 MMHG | WEIGHT: 185.19 LBS | SYSTOLIC BLOOD PRESSURE: 118 MMHG | HEIGHT: 62 IN

## 2024-02-28 DIAGNOSIS — Z11.3 SCREEN FOR STD (SEXUALLY TRANSMITTED DISEASE): ICD-10-CM

## 2024-02-28 DIAGNOSIS — Z11.3 SCREEN FOR STD (SEXUALLY TRANSMITTED DISEASE): Primary | ICD-10-CM

## 2024-02-28 LAB
B-HCG UR QL: NEGATIVE
C TRACH DNA SPEC QL NAA+PROBE: NOT DETECTED
CTP QC/QA: YES
HBV SURFACE AG SERPL QL IA: NORMAL
HCV AB SERPL QL IA: NORMAL
HIV 1+2 AB+HIV1 P24 AG SERPL QL IA: NORMAL
N GONORRHOEA DNA SPEC QL NAA+PROBE: NOT DETECTED

## 2024-02-28 PROCEDURE — 3008F BODY MASS INDEX DOCD: CPT | Mod: CPTII,S$GLB,, | Performed by: STUDENT IN AN ORGANIZED HEALTH CARE EDUCATION/TRAINING PROGRAM

## 2024-02-28 PROCEDURE — 87389 HIV-1 AG W/HIV-1&-2 AB AG IA: CPT | Performed by: STUDENT IN AN ORGANIZED HEALTH CARE EDUCATION/TRAINING PROGRAM

## 2024-02-28 PROCEDURE — 3074F SYST BP LT 130 MM HG: CPT | Mod: CPTII,S$GLB,, | Performed by: STUDENT IN AN ORGANIZED HEALTH CARE EDUCATION/TRAINING PROGRAM

## 2024-02-28 PROCEDURE — 1159F MED LIST DOCD IN RCRD: CPT | Mod: CPTII,S$GLB,, | Performed by: STUDENT IN AN ORGANIZED HEALTH CARE EDUCATION/TRAINING PROGRAM

## 2024-02-28 PROCEDURE — 99999 PR PBB SHADOW E&M-EST. PATIENT-LVL III: CPT | Mod: PBBFAC,,, | Performed by: STUDENT IN AN ORGANIZED HEALTH CARE EDUCATION/TRAINING PROGRAM

## 2024-02-28 PROCEDURE — 86592 SYPHILIS TEST NON-TREP QUAL: CPT | Performed by: STUDENT IN AN ORGANIZED HEALTH CARE EDUCATION/TRAINING PROGRAM

## 2024-02-28 PROCEDURE — 81514 NFCT DS BV&VAGINITIS DNA ALG: CPT | Performed by: STUDENT IN AN ORGANIZED HEALTH CARE EDUCATION/TRAINING PROGRAM

## 2024-02-28 PROCEDURE — 86803 HEPATITIS C AB TEST: CPT | Performed by: STUDENT IN AN ORGANIZED HEALTH CARE EDUCATION/TRAINING PROGRAM

## 2024-02-28 PROCEDURE — 3078F DIAST BP <80 MM HG: CPT | Mod: CPTII,S$GLB,, | Performed by: STUDENT IN AN ORGANIZED HEALTH CARE EDUCATION/TRAINING PROGRAM

## 2024-02-28 PROCEDURE — 81025 URINE PREGNANCY TEST: CPT | Mod: S$GLB,,, | Performed by: STUDENT IN AN ORGANIZED HEALTH CARE EDUCATION/TRAINING PROGRAM

## 2024-02-28 PROCEDURE — 87340 HEPATITIS B SURFACE AG IA: CPT | Performed by: STUDENT IN AN ORGANIZED HEALTH CARE EDUCATION/TRAINING PROGRAM

## 2024-02-28 PROCEDURE — 99214 OFFICE O/P EST MOD 30 MIN: CPT | Mod: S$GLB,,, | Performed by: STUDENT IN AN ORGANIZED HEALTH CARE EDUCATION/TRAINING PROGRAM

## 2024-02-28 PROCEDURE — 36415 COLL VENOUS BLD VENIPUNCTURE: CPT | Performed by: STUDENT IN AN ORGANIZED HEALTH CARE EDUCATION/TRAINING PROGRAM

## 2024-02-28 PROCEDURE — 87491 CHLMYD TRACH DNA AMP PROBE: CPT | Performed by: STUDENT IN AN ORGANIZED HEALTH CARE EDUCATION/TRAINING PROGRAM

## 2024-02-28 RX ORDER — VALACYCLOVIR HYDROCHLORIDE 500 MG/1
500 TABLET, FILM COATED ORAL DAILY
Qty: 90 TABLET | Refills: 3 | Status: SHIPPED | OUTPATIENT
Start: 2024-02-28 | End: 2025-02-27

## 2024-02-28 NOTE — PROGRESS NOTES
"OBSTETRICS AND GYNECOLOGY    Subjective:      Chief Complaint: STD eval    HPI:  Noreen Olivas is an 28 y.o.  presenting with concerns of possible vulvovaginitis / STD screening. Woke up this morning with some external itch. Hot yoga last night. Some pressure felt at that time. No dysuria, hematuria, or fevers. No vaginal discharge, no new odor. Recent intercourse with a previous partner. Recent antibiotic: no. New products: has been taking baths with epsom salts. Requests STD screening. Endorses her safety. No additional complaints.    Review of systems:  Denies fever, abnormal vaginal bleeding or discharge, hematuria, or dysuria.     OB History    Para Term  AB Living   0 0 0 0 0 0   SAB IAB Ectopic Multiple Live Births   0 0 0 0 0     Past Medical History:   Diagnosis Date    ADHD (attention deficit hyperactivity disorder)     History of chlamydia infection 2020    Vaccine for human papilloma virus (HPV) types 6, 11, 16, and 18 administered , ,     3 of 3 received      History reviewed. No pertinent surgical history.  Family History   Problem Relation Age of Onset    No Known Problems Father     No Known Problems Mother     Melanoma Maternal Grandmother     Breast cancer Neg Hx     Colon cancer Neg Hx     Ovarian cancer Neg Hx        Allergies: Review of patient's allergies indicates:  No Known Allergies    Medications:   Current Outpatient Medications   Medication Sig Dispense Refill    ALPRAZolam (XANAX) 0.5 MG tablet Take 1 tablet (0.5 mg total) by mouth nightly. 30 tablet 1    IBUPROFEN ORAL Take by mouth.       No current facility-administered medications for this visit.       Social History     Tobacco Use    Smoking status: Never    Smokeless tobacco: Never   Substance Use Topics    Alcohol use: Yes     Comment: socially        Objective:   /70 (BP Location: Left arm, Patient Position: Sitting)   Ht 5' 2" (1.575 m)   Wt 84 kg (185 lb 3 oz)   LMP 2024 " (Approximate)   BMI 33.87 kg/m²   Physical Exam    GENERAL: Alert, well dressed, well nourished. Appropriate mood and affect. Pleasant.  HEENT: Normocephalic, atraumatic. Extraocular movements intact. Hearing and vision grossly intact.   PULMONARY: No respiratory distress. No use of accessory muscles of respiration. No cyanosis. Speaking comfortably in full sentences.   CARDIAC: Well perfused.    ABDOMEN: Soft.   EXTREMITIES: No edema. No visible rashes.     PELVIC:   EXTERNAL GENITALIA: No lesions or masses, non-erythematous.  URETHRA: Patent, no discharge.   VAGINA: Pink, moist, rugated, no discharge.   CERVIX: Nulliparous, no lesions or masses, os closed, no blood or discharge per os, no cervical motion tenderness.   UTERUS: Firm, mobile, midline, 6 weeks size, non-tender.   ADNEXA: Non-tender, non-palpable.    Assessment/Plan:     - UPT  - Affirm; discussed possible coverage/cost, agreeable, obtained  - Ordered Blood screening, GCCT  - Refill valtrex, daily prophylaxis  Last outbreak about 3 weeks ago, stress is a trigger  - Follow up results, for plan/treatment course  - Patient of Dr. Elias, schedule annual        As of April 1, 2021, the Cures Act has been passed nationally. This new law requires that all doctors progress notes, lab results, pathology reports and radiology reports be released IMMEDIATELY to the patient in the patient portal. That means that the results are released to you at the EXACT same time they are released to me. Therefore, with all of the patients that I have I am not able to reply to each patient exactly when the results come in. So there will be a delay from when you see the results to when I see them and have time to come up with a response to send you. Also I only see these results when I am on the computer at work. So if the results come in over the weekend or after 5 pm of a work day, I will not see them until the next business day. 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 So please be patient!   Thanks for your understanding and patience.

## 2024-02-29 ENCOUNTER — PATIENT MESSAGE (OUTPATIENT)
Dept: OBSTETRICS AND GYNECOLOGY | Facility: CLINIC | Age: 29
End: 2024-02-29
Payer: COMMERCIAL

## 2024-02-29 LAB — RPR SER QL: NORMAL

## 2024-03-01 LAB
BACTERIAL VAGINOSIS DNA: NEGATIVE
CANDIDA GLABRATA DNA: NEGATIVE
CANDIDA KRUSEI DNA: NEGATIVE
CANDIDA RRNA VAG QL PROBE: NEGATIVE
T VAGINALIS RRNA GENITAL QL PROBE: NEGATIVE

## 2024-03-05 ENCOUNTER — PATIENT MESSAGE (OUTPATIENT)
Dept: ADMINISTRATIVE | Facility: OTHER | Age: 29
End: 2024-03-05
Payer: COMMERCIAL

## 2024-03-05 ENCOUNTER — TELEPHONE (OUTPATIENT)
Dept: ADMINISTRATIVE | Facility: OTHER | Age: 29
End: 2024-03-05
Payer: COMMERCIAL

## 2024-03-05 NOTE — TELEPHONE ENCOUNTER
LM for patient to contact our scheduling office to discuss rescheduling Bariatrics appointment of 1-, and My Chart message sent as well.

## 2024-04-02 ENCOUNTER — OFFICE VISIT (OUTPATIENT)
Dept: OBSTETRICS AND GYNECOLOGY | Facility: CLINIC | Age: 29
End: 2024-04-02
Attending: OBSTETRICS & GYNECOLOGY
Payer: COMMERCIAL

## 2024-04-02 VITALS — DIASTOLIC BLOOD PRESSURE: 74 MMHG | WEIGHT: 184 LBS | SYSTOLIC BLOOD PRESSURE: 118 MMHG | BODY MASS INDEX: 33.65 KG/M2

## 2024-04-02 DIAGNOSIS — Z01.419 ENCOUNTER FOR GYNECOLOGICAL EXAMINATION: Primary | ICD-10-CM

## 2024-04-02 DIAGNOSIS — Z12.4 SCREENING FOR CERVICAL CANCER: ICD-10-CM

## 2024-04-02 DIAGNOSIS — Z11.51 SCREENING FOR HPV (HUMAN PAPILLOMAVIRUS): ICD-10-CM

## 2024-04-02 PROCEDURE — 3008F BODY MASS INDEX DOCD: CPT | Mod: CPTII,S$GLB,, | Performed by: OBSTETRICS & GYNECOLOGY

## 2024-04-02 PROCEDURE — 99999 PR PBB SHADOW E&M-EST. PATIENT-LVL II: CPT | Mod: PBBFAC,,, | Performed by: OBSTETRICS & GYNECOLOGY

## 2024-04-02 PROCEDURE — 99395 PREV VISIT EST AGE 18-39: CPT | Mod: S$GLB,,, | Performed by: OBSTETRICS & GYNECOLOGY

## 2024-04-02 PROCEDURE — 88175 CYTOPATH C/V AUTO FLUID REDO: CPT | Performed by: OBSTETRICS & GYNECOLOGY

## 2024-04-02 PROCEDURE — 87624 HPV HI-RISK TYP POOLED RSLT: CPT | Performed by: OBSTETRICS & GYNECOLOGY

## 2024-04-02 PROCEDURE — 3074F SYST BP LT 130 MM HG: CPT | Mod: CPTII,S$GLB,, | Performed by: OBSTETRICS & GYNECOLOGY

## 2024-04-02 PROCEDURE — 3078F DIAST BP <80 MM HG: CPT | Mod: CPTII,S$GLB,, | Performed by: OBSTETRICS & GYNECOLOGY

## 2024-04-02 PROCEDURE — 1159F MED LIST DOCD IN RCRD: CPT | Mod: CPTII,S$GLB,, | Performed by: OBSTETRICS & GYNECOLOGY

## 2024-04-02 PROCEDURE — 1160F RVW MEDS BY RX/DR IN RCRD: CPT | Mod: CPTII,S$GLB,, | Performed by: OBSTETRICS & GYNECOLOGY

## 2024-04-02 NOTE — PROGRESS NOTES
Subjective:       Patient ID: Noreen Olivas is a 28 y.o. female.    Chief Complaint:  Well Woman (Last pap/HPV : 11/2022 normal/negative)      History of Present Illness  - here for annual. Periods are mostly regular.     Past Medical History:   Diagnosis Date    ADHD (attention deficit hyperactivity disorder)     History of chlamydia infection 2020    Vaccine for human papilloma virus (HPV) types 6, 11, 16, and 18 administered 2007, 2007, 2008    3 of 3 received        History reviewed. No pertinent surgical history.     Family History   Problem Relation Age of Onset    No Known Problems Father     No Known Problems Mother     Melanoma Maternal Grandmother     Breast cancer Neg Hx     Colon cancer Neg Hx     Ovarian cancer Neg Hx         Social History     Socioeconomic History    Marital status: Single   Tobacco Use    Smoking status: Never    Smokeless tobacco: Never   Substance and Sexual Activity    Alcohol use: Yes     Comment: socially     Drug use: Never    Sexual activity: Yes     Partners: Male     Birth control/protection: None     Social Determinants of Health     Financial Resource Strain: Medium Risk (11/30/2023)    Overall Financial Resource Strain (CARDIA)     Difficulty of Paying Living Expenses: Somewhat hard   Food Insecurity: No Food Insecurity (11/30/2023)    Hunger Vital Sign     Worried About Running Out of Food in the Last Year: Never true     Ran Out of Food in the Last Year: Never true   Transportation Needs: No Transportation Needs (11/30/2023)    PRAPARE - Transportation     Lack of Transportation (Medical): No     Lack of Transportation (Non-Medical): No   Physical Activity: Insufficiently Active (11/30/2023)    Exercise Vital Sign     Days of Exercise per Week: 1 day     Minutes of Exercise per Session: 10 min   Stress: Stress Concern Present (11/30/2023)    [employer] - Occupational Stress Questionnaire     Feeling of Stress : Very much   Social  Connections: Unknown (11/30/2023)    Social Connection and Isolation Panel [NHANES]     Frequency of Communication with Friends and Family: More than three times a week     Frequency of Social Gatherings with Friends and Family: Three times a week     Active Member of Clubs or Organizations: Yes     Attends Club or Organization Meetings: 1 to 4 times per year     Marital Status: Never    Housing Stability: Low Risk  (11/30/2023)    Housing Stability Vital Sign     Unable to Pay for Housing in the Last Year: No     Number of Places Lived in the Last Year: 1     Unstable Housing in the Last Year: No           Objective:     Vitals:    04/02/24 1509   BP: 118/74   Weight: 83.4 kg (183 lb 15.6 oz)       Physical Exam:   Constitutional: She is oriented to person, place, and time. She appears well-developed and well-nourished.        Pulmonary/Chest: Right breast exhibits no mass, no nipple discharge, no skin change, no tenderness and no swelling. Left breast exhibits no mass, no nipple discharge, no skin change, no tenderness and no swelling. Breasts are symmetrical.        Abdominal: Soft. She exhibits no distension. There is no abdominal tenderness.     Genitourinary:    Vagina and uterus normal.   There is no tenderness or lesion on the right labia. There is no tenderness or lesion on the left labia. Cervix is normal. Right adnexum displays no mass, no tenderness and no fullness. Left adnexum displays no mass, no tenderness and no fullness. No vaginal discharge in the vagina.    pap smear completed          Musculoskeletal: Moves all extremeties.       Neurological: She is alert and oriented to person, place, and time.     Psychiatric: She has a normal mood and affect.        A female chaperone was present for exam.    Assessment/ Plan:     Orders Placed This Encounter    HPV High Risk Genotypes, PCR    Liquid-Based Pap Smear, Screening       Noreen was seen today for well woman.    Diagnoses and all orders for  this visit:    Encounter for gynecological examination    Screening for cervical cancer  -     Liquid-Based Pap Smear, Screening    Screening for HPV (human papillomavirus)  -     HPV High Risk Genotypes, PCR        Follow up in about 1 year (around 4/2/2025) for annual exam.    As of April 1, 2021, the Cures Act has been passed nationally. This new law requires that all doctors progress notes, lab results, pathology reports and radiology reports be released IMMEDIATELY to the patient in the patient portal. That means that the results are released to you at the EXACT same time they are released to me. Therefore, with all of the patients that I have I am not able to reply to each patient exactly when the results come in. So there will be a delay from when you see the results to when I see them and have time to come up with a response to send you. Also I only see these results when I am on the computer at work. So if the results come in over the weekend or after 5 pm of a work day, I will not see them until the next business day. As you can tell, this is a challenge as a physician to give every patient the quick response they hope for and deserve. So please be patient!   Thanks for your understanding and patience.

## 2024-04-05 LAB
HPV HR 12 DNA SPEC QL NAA+PROBE: NEGATIVE
HPV16 AG SPEC QL: NEGATIVE
HPV18 DNA SPEC QL NAA+PROBE: NEGATIVE

## 2024-04-08 LAB
FINAL PATHOLOGIC DIAGNOSIS: NORMAL
Lab: NORMAL

## 2024-06-10 ENCOUNTER — PATIENT MESSAGE (OUTPATIENT)
Dept: INTERNAL MEDICINE | Facility: CLINIC | Age: 29
End: 2024-06-10
Payer: COMMERCIAL

## 2024-07-25 ENCOUNTER — PATIENT MESSAGE (OUTPATIENT)
Dept: INTERNAL MEDICINE | Facility: CLINIC | Age: 29
End: 2024-07-25
Payer: COMMERCIAL

## 2024-07-25 NOTE — TELEPHONE ENCOUNTER
Called pt to schedule but no answer. Scheduled pt and sent portal msg to inform of appointment and relay Dr. Guillermo, Dr. Nash' covering provider, msg:     Recommend appt with someone please, such as RJ     Encourage pt to reschedule appt from phillip or portal, or reach out to us, if appt time didn't work.

## 2024-08-05 ENCOUNTER — OFFICE VISIT (OUTPATIENT)
Dept: INTERNAL MEDICINE | Facility: CLINIC | Age: 29
End: 2024-08-05
Payer: COMMERCIAL

## 2024-08-05 VITALS
DIASTOLIC BLOOD PRESSURE: 80 MMHG | OXYGEN SATURATION: 100 % | BODY MASS INDEX: 33.55 KG/M2 | WEIGHT: 182.31 LBS | SYSTOLIC BLOOD PRESSURE: 120 MMHG | HEIGHT: 62 IN

## 2024-08-05 DIAGNOSIS — K59.01 CONSTIPATION BY DELAYED COLONIC TRANSIT: Primary | ICD-10-CM

## 2024-08-05 PROBLEM — E66.811 CLASS 1 OBESITY WITH BODY MASS INDEX (BMI) OF 34.0 TO 34.9 IN ADULT: Status: RESOLVED | Noted: 2023-12-06 | Resolved: 2024-08-05

## 2024-08-05 PROBLEM — E66.9 CLASS 1 OBESITY WITH BODY MASS INDEX (BMI) OF 34.0 TO 34.9 IN ADULT: Status: RESOLVED | Noted: 2023-12-06 | Resolved: 2024-08-05

## 2024-08-05 PROCEDURE — 99214 OFFICE O/P EST MOD 30 MIN: CPT | Mod: S$GLB,,,

## 2024-08-05 PROCEDURE — 3008F BODY MASS INDEX DOCD: CPT | Mod: CPTII,S$GLB,,

## 2024-08-05 PROCEDURE — 3074F SYST BP LT 130 MM HG: CPT | Mod: CPTII,S$GLB,,

## 2024-08-05 PROCEDURE — 3079F DIAST BP 80-89 MM HG: CPT | Mod: CPTII,S$GLB,,

## 2024-08-05 PROCEDURE — 1159F MED LIST DOCD IN RCRD: CPT | Mod: CPTII,S$GLB,,

## 2024-08-05 PROCEDURE — 99999 PR PBB SHADOW E&M-EST. PATIENT-LVL III: CPT | Mod: PBBFAC,,,

## 2024-08-05 RX ORDER — POLYETHYLENE GLYCOL 3350 17 G/17G
17 POWDER, FOR SOLUTION ORAL DAILY
Qty: 30 EACH | Refills: 3 | Status: SHIPPED | OUTPATIENT
Start: 2024-08-05 | End: 2024-12-03

## 2024-08-05 RX ORDER — DOCUSATE SODIUM 100 MG/1
100 CAPSULE, LIQUID FILLED ORAL DAILY
Qty: 30 CAPSULE | Refills: 3 | Status: SHIPPED | OUTPATIENT
Start: 2024-08-05 | End: 2024-12-03

## 2024-08-07 ENCOUNTER — HOSPITAL ENCOUNTER (OUTPATIENT)
Dept: RADIOLOGY | Facility: OTHER | Age: 29
Discharge: HOME | End: 2024-08-07
Payer: COMMERCIAL

## 2024-08-07 DIAGNOSIS — K59.01 CONSTIPATION BY DELAYED COLONIC TRANSIT: ICD-10-CM

## 2024-08-07 PROCEDURE — 74018 RADEX ABDOMEN 1 VIEW: CPT | Mod: TC,FY

## 2024-08-22 ENCOUNTER — TELEPHONE (OUTPATIENT)
Dept: URGENT CARE | Facility: CLINIC | Age: 29
End: 2024-08-22

## 2024-08-22 ENCOUNTER — OFFICE VISIT (OUTPATIENT)
Dept: URGENT CARE | Facility: CLINIC | Age: 29
End: 2024-08-22
Payer: COMMERCIAL

## 2024-08-22 VITALS
RESPIRATION RATE: 19 BRPM | OXYGEN SATURATION: 99 % | HEART RATE: 88 BPM | SYSTOLIC BLOOD PRESSURE: 118 MMHG | BODY MASS INDEX: 33.49 KG/M2 | HEIGHT: 62 IN | TEMPERATURE: 98 F | WEIGHT: 182 LBS | DIASTOLIC BLOOD PRESSURE: 76 MMHG

## 2024-08-22 DIAGNOSIS — R09.81 NASAL CONGESTION: ICD-10-CM

## 2024-08-22 DIAGNOSIS — J02.9 SORE THROAT: Primary | ICD-10-CM

## 2024-08-22 DIAGNOSIS — R53.83 FATIGUE, UNSPECIFIED TYPE: ICD-10-CM

## 2024-08-22 DIAGNOSIS — J31.0 RHINITIS, UNSPECIFIED TYPE: ICD-10-CM

## 2024-08-22 LAB
CTP QC/QA: YES
CTP QC/QA: YES
MOLECULAR STREP A: NEGATIVE
SARS-COV-2 AG RESP QL IA.RAPID: NEGATIVE

## 2024-08-22 RX ORDER — FLUTICASONE PROPIONATE 50 MCG
2 SPRAY, SUSPENSION (ML) NASAL DAILY PRN
Qty: 15.8 ML | Refills: 0 | Status: SHIPPED | OUTPATIENT
Start: 2024-08-22

## 2024-08-22 RX ORDER — LORATADINE 10 MG/1
10 TABLET ORAL DAILY PRN
Qty: 30 TABLET | Refills: 0 | Status: SHIPPED | OUTPATIENT
Start: 2024-08-22

## 2024-08-22 NOTE — TELEPHONE ENCOUNTER
Contacted patient in regards to inform her that covid test was negative per Np. Tammy Fontaine. db

## 2024-08-22 NOTE — PROGRESS NOTES
"Subjective:      Patient ID: Noreen Olivas is a 29 y.o. female.    Vitals:  height is 5' 2" (1.575 m) and weight is 82.6 kg (182 lb). Her tympanic temperature is 97.6 °F (36.4 °C). Her blood pressure is 118/76 and her pulse is 88. Her respiration is 19 and oxygen saturation is 99%.     Chief Complaint: Sore Throat    Patient presents with sore throat. Onset 2 days    29-year-old female presents to clinic with complaints of sore throat x 2 days. States co-worker with c/o sore throat currently taking antibiotics. Requesting steroid injection to speed up her healing process.    Sore Throat   This is a new problem. Episode onset: -2days. There has been no fever. Associated symptoms include congestion. Pertinent negatives include no abdominal pain, coughing, headaches, shortness of breath or vomiting. The treatment provided no relief.       Constitution: Positive for fatigue.   HENT:  Positive for congestion, postnasal drip and sore throat.    Respiratory:  Negative for cough, shortness of breath, wheezing and asthma.    Gastrointestinal:  Negative for abdominal pain, nausea and vomiting.   Musculoskeletal:  Negative for muscle ache.   Allergic/Immunologic: Negative for asthma.   Neurological:  Negative for headaches.      Objective:     Physical Exam   Constitutional: She is oriented to person, place, and time. She appears well-developed. She is cooperative.  Non-toxic appearance. She does not appear ill. No distress.   HENT:   Head: Normocephalic and atraumatic.   Ears:   Right Ear: Hearing, external ear and ear canal normal. Tympanic membrane is bulging.   Left Ear: Hearing, external ear and ear canal normal. Tympanic membrane is bulging.   Nose: Mucosal edema and rhinorrhea present. No nasal deformity. No epistaxis. Right sinus exhibits no maxillary sinus tenderness and no frontal sinus tenderness. Left sinus exhibits no maxillary sinus tenderness and no frontal sinus tenderness.   Mouth/Throat: Uvula is " midline, oropharynx is clear and moist and mucous membranes are normal. No trismus in the jaw. Normal dentition. No uvula swelling. No oropharyngeal exudate, posterior oropharyngeal edema or posterior oropharyngeal erythema.   Eyes: Conjunctivae and lids are normal. No scleral icterus.   Neck: Trachea normal and phonation normal. Neck supple. No edema present. No erythema present. No neck rigidity present.   Cardiovascular: Normal rate, regular rhythm and normal heart sounds.   Pulmonary/Chest: Effort normal and breath sounds normal. No respiratory distress. She has no decreased breath sounds. She has no rhonchi.   Abdominal: Normal appearance.   Musculoskeletal: Normal range of motion.         General: No deformity. Normal range of motion.   Neurological: She is alert and oriented to person, place, and time. She exhibits normal muscle tone. Coordination normal.   Skin: Skin is warm, dry, intact, not diaphoretic and not pale.   Psychiatric: Her speech is normal and behavior is normal. Judgment and thought content normal.   Nursing note and vitals reviewed.      Assessment:     1. Sore throat    2. Nasal congestion    3. Rhinitis, unspecified type    4. Fatigue, unspecified type      Results for orders placed or performed in visit on 08/22/24   POCT Strep A, Molecular   Result Value Ref Range    Molecular Strep A, POC Negative Negative     Acceptable Yes    SARS Coronavirus 2 Antigen, POCT Manual Read   Result Value Ref Range    SARS Coronavirus 2 Antigen Negative Negative     Acceptable Yes       Plan:       Sore throat  -     POCT Strep A, Molecular  -     SARS Coronavirus 2 Antigen, POCT Manual Read    Nasal congestion  -     fluticasone propionate (FLONASE) 50 mcg/actuation nasal spray; 2 sprays (100 mcg total) by Each Nostril route daily as needed (nasal congestion).  Dispense: 15.8 mL; Refill: 0  -     SARS Coronavirus 2 Antigen, POCT Manual Read    Rhinitis, unspecified type  -      loratadine (CLARITIN) 10 mg tablet; Take 1 tablet (10 mg total) by mouth daily as needed (rhinitis).  Dispense: 30 tablet; Refill: 0  -     SARS Coronavirus 2 Antigen, POCT Manual Read    Fatigue, unspecified type     Requesting a call with covid results; need to return to work     Discussed based upon examination, steroid injection not required.    Patient Instructions   Please drink plenty of fluids.  Please get plenty of rest.  Please return here or go to the Emergency Department for any concerns or worsening of condition.  It is ok to take over the counter plain Allegra or Claritin or Zyrtec.   If you do have Hypertension or palpitations, it is safe to take Coricidin HBP for relief of sinus symptoms.  We recommend you take Flonase (Fluticasone) or another nasally inhaled steroid unless you are already taking one.  Nasal irrigation with a saline spray or Netti Pot like device per their directions is also recommended.  If not allergic, please take over the counter Tylenol (Acetaminophen) and/or Motrin (Ibuprofen) as directed for control of pain and/or fever.  Please follow up with your primary care doctor or specialist as needed.    If you  smoke, please stop smoking.

## 2024-10-15 ENCOUNTER — OFFICE VISIT (OUTPATIENT)
Dept: URGENT CARE | Facility: CLINIC | Age: 29
End: 2024-10-15
Payer: COMMERCIAL

## 2024-10-15 VITALS
RESPIRATION RATE: 20 BRPM | DIASTOLIC BLOOD PRESSURE: 81 MMHG | HEART RATE: 66 BPM | SYSTOLIC BLOOD PRESSURE: 115 MMHG | BODY MASS INDEX: 32.57 KG/M2 | WEIGHT: 177 LBS | TEMPERATURE: 98 F | OXYGEN SATURATION: 97 % | HEIGHT: 62 IN

## 2024-10-15 DIAGNOSIS — J02.0 STREP PHARYNGITIS: Primary | ICD-10-CM

## 2024-10-15 DIAGNOSIS — J02.9 SORE THROAT: ICD-10-CM

## 2024-10-15 LAB
CTP QC/QA: YES
MOLECULAR STREP A: POSITIVE

## 2024-10-15 PROCEDURE — 99213 OFFICE O/P EST LOW 20 MIN: CPT | Mod: S$GLB,,, | Performed by: NURSE PRACTITIONER

## 2024-10-15 PROCEDURE — 87651 STREP A DNA AMP PROBE: CPT | Mod: QW,S$GLB,, | Performed by: NURSE PRACTITIONER

## 2024-10-15 RX ORDER — AMOXICILLIN 500 MG/1
500 TABLET, FILM COATED ORAL EVERY 12 HOURS
Qty: 20 TABLET | Refills: 0 | Status: SHIPPED | OUTPATIENT
Start: 2024-10-15 | End: 2024-10-25

## 2024-10-15 NOTE — PROGRESS NOTES
"Subjective:      Patient ID: Noreen Olivas is a 29 y.o. female.    Vitals:  height is 5' 2" (1.575 m) and weight is 80.3 kg (177 lb). Her oral temperature is 97.8 °F (36.6 °C). Her blood pressure is 115/81 and her pulse is 66. Her respiration is 20 and oxygen saturation is 97%.     Chief Complaint: Sore Throat (/) and Back Pain    29 year old female sore throat, neck pain, swollen glands, head fogginess, and body aches that began 2 days ago.  Symptoms started after she went to an [employer] game.  She took OTC Nyquil & Dayquil which helped her sleep.  Denies cough or sinus issues.     Sore Throat   This is a new problem. The current episode started in the past 7 days. The problem has been gradually worsening. Neither side of throat is experiencing more pain than the other. There has been no fever. The pain is at a severity of 8/10. The pain is severe. Associated symptoms include headaches, a plugged ear sensation, neck pain, swollen glands and trouble swallowing. Pertinent negatives include no abdominal pain, congestion, coughing, diarrhea, drooling, ear discharge, ear pain, hoarse voice, shortness of breath, stridor or vomiting. She has had no exposure to strep or mono. She has tried nothing for the symptoms.       Constitution: Negative for chills, fatigue and fever.   HENT:  Positive for sore throat and trouble swallowing. Negative for ear pain, ear discharge, drooling and congestion.    Neck: Positive for neck pain.   Respiratory:  Negative for cough, shortness of breath and stridor.    Gastrointestinal:  Negative for abdominal pain, vomiting and diarrhea.   Neurological:  Positive for headaches.      Objective:     Physical Exam   Constitutional: She is oriented to person, place, and time. She appears well-developed. She is cooperative.  Non-toxic appearance. She does not appear ill. No distress.   HENT:   Head: Normocephalic and atraumatic.   Ears:   Right Ear: Hearing, tympanic membrane, external ear and ear " canal normal.   Left Ear: Hearing, tympanic membrane, external ear and ear canal normal.   Nose: Nose normal. No mucosal edema, rhinorrhea or nasal deformity. No epistaxis. Right sinus exhibits no maxillary sinus tenderness and no frontal sinus tenderness. Left sinus exhibits no maxillary sinus tenderness and no frontal sinus tenderness.   Mouth/Throat: Uvula is midline and mucous membranes are normal. No trismus in the jaw. Normal dentition. No uvula swelling. Posterior oropharyngeal erythema present. No oropharyngeal exudate, posterior oropharyngeal edema or tonsillar abscesses. Tonsils are 2+ on the right. Tonsils are 2+ on the left. No tonsillar exudate.   Eyes: Conjunctivae and lids are normal. No scleral icterus.   Neck: Trachea normal and phonation normal. Neck supple. No edema present. No erythema present. No neck rigidity present.   Cardiovascular: Normal rate, regular rhythm, normal heart sounds and normal pulses.   Pulmonary/Chest: Effort normal and breath sounds normal. No respiratory distress. She has no decreased breath sounds. She has no rhonchi.   Abdominal: Normal appearance.   Musculoskeletal: Normal range of motion.         General: No deformity. Normal range of motion.   Lymphadenopathy:     She has cervical adenopathy.        Right cervical: Superficial cervical adenopathy present.        Left cervical: Superficial cervical adenopathy present.   Neurological: She is alert and oriented to person, place, and time. She exhibits normal muscle tone. Coordination normal.   Skin: Skin is warm, dry, intact, not diaphoretic and not pale.   Psychiatric: Her speech is normal and behavior is normal. Judgment and thought content normal.   Nursing note and vitals reviewed.      Assessment:     1. Strep pharyngitis    2. Sore throat        Plan:     Results for orders placed or performed in visit on 10/15/24   POCT Strep A, Molecular    Collection Time: 10/15/24 10:15 AM   Result Value Ref Range    Molecular  Strep A, POC Positive (A) Negative     Acceptable Yes        Strep pharyngitis  -     amoxicillin (AMOXIL) 500 MG Tab; Take 1 tablet (500 mg total) by mouth every 12 (twelve) hours. for 10 days  Dispense: 20 tablet; Refill: 0    Sore throat  -     POCT Strep A, Molecular      Patient Instructions                                                         Pharyngitis   If your condition worsens or fails to improve we recommend that you receive another evaluation at the ER immediately or contact your PCP to discuss your concerns or return here. You must understand that you've received an urgent care treatment only and that you may be released before all your medical problems are known or treated. You the patient will arrange for followup care as instructed.   If the strept culture was done and returns negative in 3-5 days and you are still having a sore throat, you may need to get a mono spot test done or repeated.   Tylenol or ibuprofen for pain may help as long as you are not allergic to these meds or have a medical condition such as stomach ulcers, liver or kidney disease or taking blood thinners etc that would   prevent you from using these medications.   Rest and fluids will help as well.   If you were prescribed antibiotics and are female and on BCP use additional methods to prevent pregnancy while on the antibiotics and for one cycle after

## 2024-10-15 NOTE — LETTER
October 15, 2024      [facility] [address] [place] [facility] [address]  Phone: [phone]  Fax: [phone]       Patient: Noreen Olivas   YOB: 1995  Date of Visit: 10/15/2024    To Whom It May Concern:    Jenna Olivas  was at [facility] on 10/15/2024. The patient may return to work/school on 10/16/24 with no restrictions. If you have any questions or concerns, or if I can be of further assistance, please do not hesitate to contact me.    Sincerely,            Susanna Yoder, NP

## 2024-11-05 ENCOUNTER — ON-DEMAND VIRTUAL (OUTPATIENT)
Dept: URGENT CARE | Facility: CLINIC | Age: 29
End: 2024-11-05
Payer: COMMERCIAL

## 2024-11-05 ENCOUNTER — OFFICE VISIT (OUTPATIENT)
Dept: URGENT CARE | Facility: CLINIC | Age: 29
End: 2024-11-05
Payer: COMMERCIAL

## 2024-11-05 VITALS
BODY MASS INDEX: 32.2 KG/M2 | OXYGEN SATURATION: 98 % | HEIGHT: 62 IN | HEART RATE: 101 BPM | RESPIRATION RATE: 18 BRPM | TEMPERATURE: 99 F | DIASTOLIC BLOOD PRESSURE: 81 MMHG | SYSTOLIC BLOOD PRESSURE: 120 MMHG | WEIGHT: 175 LBS

## 2024-11-05 DIAGNOSIS — J02.9 SORE THROAT: ICD-10-CM

## 2024-11-05 DIAGNOSIS — J02.0 RECURRENT STREPTOCOCCAL PHARYNGITIS: ICD-10-CM

## 2024-11-05 DIAGNOSIS — J02.9 SORE THROAT: Primary | ICD-10-CM

## 2024-11-05 DIAGNOSIS — J02.0 STREP THROAT: Primary | ICD-10-CM

## 2024-11-05 LAB
CTP QC/QA: YES
MOLECULAR STREP A: POSITIVE

## 2024-11-05 PROCEDURE — 87651 STREP A DNA AMP PROBE: CPT | Mod: QW,S$GLB,, | Performed by: FAMILY MEDICINE

## 2024-11-05 PROCEDURE — 99213 OFFICE O/P EST LOW 20 MIN: CPT | Mod: S$GLB,,, | Performed by: FAMILY MEDICINE

## 2024-11-05 PROCEDURE — 99212 OFFICE O/P EST SF 10 MIN: CPT | Mod: 95,,, | Performed by: NURSE PRACTITIONER

## 2024-11-05 RX ORDER — CEPHALEXIN 500 MG/1
500 CAPSULE ORAL EVERY 12 HOURS
Qty: 10 CAPSULE | Refills: 0 | Status: SHIPPED | OUTPATIENT
Start: 2024-11-05 | End: 2024-11-15

## 2024-11-05 NOTE — PATIENT INSTRUCTIONS
General Discharge Instructions   PLEASE READ YOUR DISCHARGE INSTRUCTIONS ENTIRELY AS IT CONTAINS IMPORTANT INFORMATION.  If you were prescribed a narcotic or controlled medication, do not drive or operate heavy equipment or machinery while taking these medications.  If you were prescribed antibiotics, please take them to completion.  You must understand that you've received an Urgent Care treatment only and that you may be released before all your medical problems are known or treated. You, the patient, will arrange for follow up care as instructed.    OVER THE COUNTER RECOMMENDATIONS/SUGGESTIONS.    Make sure to stay well hydrated.    Use Nasal Saline to mechanically move any post nasal drip from your eustachian tube or from the back of your throat.    Use warm salt water gargles to ease your throat pain. Warm salt water gargles as needed for sore throat- 1/2 tsp salt to 1 cup warm water, gargle as desired.    Use an antihistamine such as Claritin, Zyrtec or Allegra to dry you out.    Use pseudoephedrine (behind the counter) to decongest. Pseudoephedrine 30 mg up to 240 mg /day. It can raise your blood pressure and give you palpitations.    Use mucinex (guaifenesin) to break up mucous up to 2400mg/day to loosen any mucous.    The mucinex DM pill has a cough suppressant that can be sedating. It can be used at night to stop the tickle at the back of your throat.    You can use Mucinex D (it has guaifenesin and a high dose of pseudoephedrine) in the mornings to help decongest.    Use Afrin in each nare for no longer than 3 days, as it is addictive. It can also dry out your mucous membranes and cause elevated blood pressure. This is especially useful if you are flying.    Use Flonase 1-2 sprays/nostril per day. It is a local acting steroid nasal spray, if you develop a bloody nose, stop using the medication immediately.    Sometimes Nyquil at night is beneficial to help you get some rest, however it is sedating and it  does have an antihistamine, and tylenol.    Honey is a natural cough suppressant that can be used.    Tylenol up to 4,000 mg a day is safe for short periods and can be used for body aches, pain, and fever. However in high doses and prolonged use it can cause liver irritation.    Ibuprofen is a non-steroidal anti-inflammatory that can be used for body aches, pain, and fever.However it can also cause stomach irritation if over used.     Follow up with your PCP or specialty clinic as instructed in the next 2-3 days if not improved or as needed. You can call [phone] to schedule an appointment with appropriate provider.      If you condition worsens, we recommend that you receive another evaluation at the emergency room immediately or contact your primary medical clinic's after hours call service to discuss your concerns.      Please return here or go to the Emergency Department for any concerns or worsening condition.   You can also call [phone] to schedule an appointment with the appropriate provider.    Please return here or go to the Emergency Department for any concerns or worsening of condition.    Thank you for choosing [facility]!    Our goal in the Urgent Care is to always provide outstanding medical care. You may receive a survey by mail or e-mail in the next week regarding your experience today. We would greatly appreciate you completing and returning the survey. Your feedback provides us with a way to recognize our staff who provide very good care, and it helps us learn how to improve when your experience was below our aspiration of excellence.      We appreciate you trusting us with your medical care. We hope you feel better soon. We will be happy to take care of you for all of your future medical needs.    Sincerely,    RACHELLE Casillas

## 2024-11-05 NOTE — PROGRESS NOTES
Subjective:      Patient ID: Noreen Olivas is a 29 y.o. female.    Vitals:  vitals were not taken for this visit.     Chief Complaint: Sore Throat (Suspect recurrent strep throat- recent infection)      Visit Type: TELE AUDIOVISUAL - This visit was conducted virtually based on  subjective information and limited objective exam    Present with the patient at the time of consultation: TELEMED PRESENT WITH PATIENT: None  Two patient identifiers used to verify patient- saying out date of birth and full name.       Past Medical History:   Diagnosis Date    ADHD (attention deficit hyperactivity disorder)     History of chlamydia infection 2020    Vaccine for human papilloma virus (HPV) types 6, 11, 16, and 18 administered 2007, 2007, 2008    3 of 3 received      History reviewed. No pertinent surgical history.  Review of patient's allergies indicates:  No Known Allergies  Current Outpatient Medications on File Prior to Visit   Medication Sig Dispense Refill    fluticasone propionate (FLONASE) 50 mcg/actuation nasal spray 2 sprays (100 mcg total) by Each Nostril route daily as needed (nasal congestion). (Patient not taking: Reported on 10/15/2024) 15.8 mL 0    loratadine (CLARITIN) 10 mg tablet Take 1 tablet (10 mg total) by mouth daily as needed (rhinitis). (Patient not taking: Reported on 10/15/2024) 30 tablet 0     No current facility-administered medications on file prior to visit.     Family History   Problem Relation Name Age of Onset    No Known Problems Father      No Known Problems Mother      Melanoma Maternal Grandmother      Breast cancer Neg Hx      Colon cancer Neg Hx      Ovarian cancer Neg Hx             Ohs Peq Odvv Intake    11/5/2024  7:22 AM CST - Filed by Patient   What is your current physical address in the event of a medical emergency? [address] [place] [address]   Are you able to take your vital signs? No   Please attach any relevant images or files    Is your employer contracted with [facility]? No         28 yo female with c/o sore throat. She states she went to urgent care on 10/15 and was diagnosed with strep throat. She states she finished amoxicillin 500 mg which she completed the course of antibiotic. She did have a missed day in between completion. She states she did throw away her tooth brush. She denies fever. She states she woke up with sore throat - swollen not painful. She noticed some white on tonsil.         Constitution: Negative.   HENT:  Positive for sore throat.    Cardiovascular: Negative.    Respiratory: Negative.     Gastrointestinal: Negative.    Endocrine: negative.   Genitourinary: Negative.  Negative for frequency and urgency.   Musculoskeletal: Negative.    Skin: Negative.    Allergic/Immunologic: Negative.    Neurological: Negative.    Hematologic/Lymphatic: Negative.    Psychiatric/Behavioral: Negative.          Objective:   The physical exam was conducted virtually.  LOCATION OF PATIENT [facility] x 3 ; no acute distress noted; appearance normal; mood and behavior normal; thought process normal  Head- normocephalic  Nose- appears normal, no discharge or erythema  Eyes- pupils appear normal in size, no drainage, no erythema  Ears- normal appearing; no discharge, no erythema  Mouth- appears normal  Oropharynx- no erythema, lesions  Lungs- breathing at a normal rate, no acute distress noted  Heart- no reports of tachycardia, palpitations, chest pain  Abdomen- non distended, non tender reported by patient  Skin- warm and dry, no erythema or edema noted by patient or visualized  Psych- as above; no si/hi      Assessment:     1. Sore throat        Plan:     I recommend retesting for strep to confirm re infection- she has had a sore throat for the last several months last test on 10/15 was positive. She was treated with amoxil ? Resistant strep vs re infection or other etiology    Centor Score for Pharyngitis    AGE     >44       -1    EXUDATE    YES -+1    TENDER  LYMPH NODES  NO-0      TEMPERATURE >100.4  NO- 0      COUGH    NO +1    TOTAL= 2 test is reccomended    Thank you for choosing [facility]!    Our goal in the [facility] is to always provide outstanding medical care. You may receive a survey by mail or e-mail in the next week regarding your experience today. We would greatly appreciate you completing and returning the survey. Your feedback provides us with a way to recognize our staff who provide very good care, and it helps us learn how to improve when your experience was below our aspiration of excellence.         We appreciate you trusting us with your medical care. We hope you feel better soon. We will be happy to take care of you for all of your future medical needs.    You must understand that you've received an Urgent Care treatment only and that you may be released before all your medical problems are known or treated. You, the patient, will arrange for follow up care as instructed.    Follow up with your PCP or specialty clinic as directed in the next 1-2 weeks if not improved or as needed.  You can call [phone] to schedule an appointment with the appropriate provider.    If your condition worsens we recommend that you receive another evaluation in person, with your primary care provider, urgent care or at the emergency room immediately or contact your primary medical clinics after hours call service to discuss your concerns.         Sore throat

## 2024-11-05 NOTE — PROGRESS NOTES
"Subjective:      Patient ID: Noreen Olivas is a 29 y.o. female.    Vitals:  height is 5' 2" (1.575 m) and weight is 79.4 kg (175 lb). Her tympanic temperature is 98.9 °F (37.2 °C). Her blood pressure is 120/81 and her pulse is 101. Her respiration is 18 and oxygen saturation is 98%.     Chief Complaint: Sore Throat    Pt denies pmh, strep pos 10/15, says she completed amoxicillin, changed toothbrush. But yesterday symptoms returned with sore throat. She has been able to tolerate PO, she felt like could have a fever yesterday. Pt reports she had episodes of recurrent strep in her teenager years.     Sore Throat   This is a new problem. The current episode started yesterday. The problem has been gradually worsening. Neither side of throat is experiencing more pain than the other. There has been no fever. The pain is at a severity of 2/10. The pain is mild. Associated symptoms include neck pain, swollen glands and trouble swallowing. Pertinent negatives include no abdominal pain, congestion, coughing, diarrhea, drooling, ear discharge, ear pain, headaches, hoarse voice, plugged ear sensation, shortness of breath, stridor or vomiting. She has had no exposure to strep or mono. She has tried nothing for the symptoms. The treatment provided no relief.       Constitution: Negative for activity change, appetite change, chills, sweating, fatigue, fever, unexpected weight change and generalized weakness.   HENT:  Positive for sore throat and trouble swallowing. Negative for ear pain, ear discharge, drooling, congestion and voice change.    Neck: Positive for neck pain.   Respiratory:  Negative for cough, shortness of breath and stridor.    Gastrointestinal:  Negative for abdominal pain, vomiting and diarrhea.   Neurological:  Negative for headaches.      Objective:     Physical Exam   Constitutional: She is oriented to person, place, and time. She appears well-developed.  Non-toxic appearance. She does not appear ill. " No distress.   HENT:   Head: Normocephalic and atraumatic.   Ears:   Right Ear: External ear normal.   Left Ear: External ear normal.   Nose: Nose normal.   Mouth/Throat: Uvula is midline and mucous membranes are normal. No trismus in the jaw. No uvula swelling. Posterior oropharyngeal erythema present. No oropharyngeal exudate, posterior oropharyngeal edema, tonsillar abscesses or cobblestoning. Tonsils are 2+ on the right. Tonsils are 2+ on the left. Tonsillar exudate.   Eyes: Conjunctivae, EOM and lids are normal. Pupils are equal, round, and reactive to light.   Neck: Trachea normal and phonation normal. Neck supple. No Brudzinski's sign and no Kernig's sign noted.   Musculoskeletal: Normal range of motion.         General: Normal range of motion.   Lymphadenopathy:     She has cervical adenopathy.   Neurological: She is alert and oriented to person, place, and time.   Skin: Skin is warm, dry, intact and not diaphoretic.   Psychiatric: Her speech is normal and behavior is normal. Judgment and thought content normal.   Nursing note and vitals reviewed.    Results for orders placed or performed in visit on 11/05/24   POCT Strep A, Molecular    Collection Time: 11/05/24  2:15 PM   Result Value Ref Range    Molecular Strep A, POC Positive (A) Negative     Acceptable Yes       Assessment:     1. Strep throat    2. Sore throat    3. Recurrent streptococcal pharyngitis        Plan:     Strep pos  Advised on salt water gargles, throat lozenges, tea with honey, alternate tylenol and ibu for ha/body aches  Prn ref to ent if no improvement    Discussed results/diagnosis/plan with patient in clinic. Strict precautions given to patient to monitor for worsening signs and symptoms. Advised to follow up with PCP or specialist.    Explained side effects of medications prescribed with patient and informed him/her to discontinue use if he/she has any side effects and to inform UC or PCP if this occurs. All questions  answered. Strict ED verses clinic return precautions stressed and given in depth. Advised if symptoms worsens of fail to improve he/she should go to the Emergency Room. Discharge and follow-up instructions given verbally/printed with the patient who expressed understanding and willingness to comply with my recommendations. Patient voiced understanding and in agreement with current treatment plan. Patient exits the exam room in no acute distress. Conversant and engaged during discharge discussion, verbalized understanding.      Strep throat  -     cephALEXin (KEFLEX) 500 MG capsule; Take 1 capsule (500 mg total) by mouth every 12 (twelve) hours. for 10 days  Dispense: 10 capsule; Refill: 0    Sore throat  -     POCT Strep A, Molecular    Recurrent streptococcal pharyngitis  -     Ambulatory referral/consult to ENT          Medical Decision Making:   History:   Old Medical Records: I decided to obtain old medical records.  Old Records Summarized: records from clinic visits.

## 2024-11-13 ENCOUNTER — OFFICE VISIT (OUTPATIENT)
Dept: OTOLARYNGOLOGY | Facility: CLINIC | Age: 29
End: 2024-11-13
Payer: COMMERCIAL

## 2024-11-13 VITALS
HEART RATE: 111 BPM | WEIGHT: 180.75 LBS | SYSTOLIC BLOOD PRESSURE: 120 MMHG | DIASTOLIC BLOOD PRESSURE: 82 MMHG | BODY MASS INDEX: 33.06 KG/M2

## 2024-11-13 DIAGNOSIS — J03.01 ACUTE RECURRENT STREPTOCOCCAL TONSILLITIS: ICD-10-CM

## 2024-11-13 DIAGNOSIS — J02.0 RECURRENT STREPTOCOCCAL PHARYNGITIS: ICD-10-CM

## 2024-11-13 DIAGNOSIS — J31.2 CHRONIC PHARYNGITIS: Primary | ICD-10-CM

## 2024-11-13 PROCEDURE — 31575 DIAGNOSTIC LARYNGOSCOPY: CPT | Mod: S$GLB,,, | Performed by: OTOLARYNGOLOGY

## 2024-11-13 PROCEDURE — 1160F RVW MEDS BY RX/DR IN RCRD: CPT | Mod: CPTII,S$GLB,, | Performed by: OTOLARYNGOLOGY

## 2024-11-13 PROCEDURE — 99204 OFFICE O/P NEW MOD 45 MIN: CPT | Mod: 25,S$GLB,, | Performed by: OTOLARYNGOLOGY

## 2024-11-13 PROCEDURE — 3008F BODY MASS INDEX DOCD: CPT | Mod: CPTII,S$GLB,, | Performed by: OTOLARYNGOLOGY

## 2024-11-13 PROCEDURE — 1159F MED LIST DOCD IN RCRD: CPT | Mod: CPTII,S$GLB,, | Performed by: OTOLARYNGOLOGY

## 2024-11-13 PROCEDURE — 3074F SYST BP LT 130 MM HG: CPT | Mod: CPTII,S$GLB,, | Performed by: OTOLARYNGOLOGY

## 2024-11-13 PROCEDURE — 99999 PR PBB SHADOW E&M-EST. PATIENT-LVL III: CPT | Mod: PBBFAC,,, | Performed by: OTOLARYNGOLOGY

## 2024-11-13 PROCEDURE — 3079F DIAST BP 80-89 MM HG: CPT | Mod: CPTII,S$GLB,, | Performed by: OTOLARYNGOLOGY

## 2024-11-13 RX ORDER — AMOXICILLIN AND CLAVULANATE POTASSIUM 875; 125 MG/1; MG/1
1 TABLET, FILM COATED ORAL EVERY 12 HOURS
Qty: 20 TABLET | Refills: 0 | Status: SHIPPED | OUTPATIENT
Start: 2024-11-13 | End: 2024-11-13

## 2024-11-13 RX ORDER — AMOXICILLIN AND CLAVULANATE POTASSIUM 875; 125 MG/1; MG/1
1 TABLET, FILM COATED ORAL EVERY 12 HOURS
Qty: 20 TABLET | Refills: 1 | Status: SHIPPED | OUTPATIENT
Start: 2024-11-13 | End: 2024-11-23

## 2024-11-13 NOTE — PROGRESS NOTES
History of Present Illness:   Noreen Olivas is a 29 y.o. year old female evaluated in the Otolaryngology-Head and Neck Surgery Clinic at [facility]. The patient was referred  for evaluation of recurrent pharyngitis.  Patient had to urgent care visits in October and November with diagnosis strep pharyngitis.  Her last visit was just 8 days ago and she was placed on Keflex.  She reports no improvement since symptoms continued sore throat and changes in voice.  She denies any hemoptysis.  She denies any dysphagia but had some mild odynophagia.  She has right-sided lymph node swelling.           Past Medical/Surgical History  Past Medical History:   Diagnosis Date    ADHD (attention deficit hyperactivity disorder)     History of chlamydia infection 2020    Vaccine for human papilloma virus (HPV) types 6, 11, 16, and 18 administered 2007, 2007, 2008    3 of 3 received      Her  has no past surgical history on file.     Past Family/Social History  Her family history includes Melanoma in her maternal grandmother; No Known Problems in her father and mother.  She  reports that she has never smoked. She has never used smokeless tobacco. She reports current alcohol use. She reports that she does not use drugs.     Medications/Allergies/Immunizations  Her current medication(s) include:   Current Outpatient Medications   Medication Sig Dispense Refill    amoxicillin-clavulanate 875-125mg (AUGMENTIN) 875-125 mg per tablet Take 1 tablet by mouth every 12 (twelve) hours. for 10 days 20 tablet 1    fluticasone propionate (FLONASE) 50 mcg/actuation nasal spray 2 sprays (100 mcg total) by Each Nostril route daily as needed (nasal congestion). (Patient not taking: Reported on 10/15/2024) 15.8 mL 0    loratadine (CLARITIN) 10 mg tablet Take 1 tablet (10 mg total) by mouth daily as needed (rhinitis). (Patient not taking: Reported on 10/15/2024) 30 tablet 0     No current facility-administered medications for this visit.         Allergies: Patient has no known allergies.     Immunizations:   Immunization History   Administered Date(s) Administered    COVID-19, MRNA, LN-S, PF (Pfizer) (Purple Cap) 07/23/2021, 08/13/2021    DTaP 1993, 1993, 1993, 12/07/1994, 05/30/1998    HIB 1993, 1993, 1993, 09/06/1994    HPV Quadrivalent 05/07/2007, 10/24/2007, 09/30/2008    Hep B / HiB 1993, 1993, 1993    Hepatitis B, Pediatric/Adolescent 1993, 1993, 1993    HiB PRP-T 1993, 1993, 1993, 09/06/1994    IPV 1993, 1993, 12/07/1994, 03/30/1998    Influenza - Quadrivalent - PF *Preferred* (6 months and older) 10/24/2007    Influenza - Trivalent - Afluria, Fluzone MDV 10/24/2007    MMR 09/06/1994, 03/30/1998    Meningococcal Conjugate (MCV4P) 05/07/2007, 11/14/2014    Tdap 05/07/2007, 08/21/2016    Varicella 06/05/1995, 05/07/2007         Review of Systems   Answers submitted by the patient for this visit:  Review of Symptoms Questionnaire  (Submitted on 11/13/2024)  Fatigue (Tiredness)?: Yes  facial swelling: Yes  sore throat: Yes  None of these : Yes  cough: Yes  None of these : Yes  None of these: Yes  None of these: Yes  Muscle aches / pain?: Yes  neck pain: Yes  None of these : Yes  None of these: Yes  None of these : Yes  headaches: Yes  swollen glands: Yes  nervous/ anxious: Yes    All other systems are negative except for that listed in the HPI.      PHYSICAL EXAM:   Vital Signs:  /82 (BP Location: Right arm, Patient Position: Sitting)   Pulse (!) 111   Wt 82 kg (180 lb 12.4 oz)   LMP 09/24/2024   BMI 33.06 kg/m²      General:  Well-developed, well-nourished  Communication and Voice:  Clear pitch and clarity  Hearing: Hearing adequate for verbal communication bilaterally   Inspection:  Normocephalic and atraumatic without mass or lesion  Palpation:  Facial skeleton intact without bony stepoffs  Parotid Glands:  No mass or  tenderness  Facial Strength:  Facial motility symmetric and full bilaterally  Pinna:  External ear intact and fully developed  External canal:  Canal is patent with intact skin  Tympanic Membrane:  Clear and mobile  External nose:  No scar or anatomic deformity  Internal Nose:  Septum intact and midline.  No edema, polyp, or rhinorrhea.  TMJ:  No pain to palpation with full mobility  Oral cavity, Lips, Teeth, and Gums:  Mucosa and teeth intact and viable, No lesions, masses or ulcers  Oropharynx: No erythema or exudate, no masses or ulcerations, tonsils are 2 to 3+ erythematous with exudates  Nasopharynx:  No mass or lesion with intact mucosa  Hypopharynx:  Not well visualized secondary to gagging  Larynx:  Not well visualized secondary to gagging  Neck, Trachea, Lymphatics:  Midline trachea without mass or lesion, no lymphadenopathy  Thyroid:  No mass or nodularity  Eyes: No nystagmus with equal extraocular motion bilaterally  Neuro/Psych/Balance: Patient oriented and appropriate in interaction;  Appropriate mood and affect;  Gait is intact with no imbalance; Cranial nerves I-XII are intact  Respiratory effort:  Equal inspiration and expiration without stridor  Peripheral Vascular:  Warm extremities with equal pulses     Procedure: Flexible fiberoptic nasopharyngoscopy/laryngoscopy   Indications: Need for detailed exam, hyperactive gag reflex, inadequate mirror visualization.  Surgeon: Charlie Marcum MD  Procedure note/findings: After informed discussion of the risks, benefits, and alternatives after indications as noted above, a fiberoptic nasopharyngoscopy and laryngoscopy was recommended for above indications, and the patient consented to this. Nasal cavities were topically anesthetized and decongested with 4% lidocaine and Afrin solutions after which a flexible scope was easily advanced without difficulty into the left and right nasal cavities as well as into the nasopharynx. Nasal cavities were intact without  gross mass or lesion. Nasopharynx, similarly, revealed no mass lesion or granularity. There was no ulceration. There was no gross asymmetry. Fossa of Rosenmueller was intact bilaterally. The eustachian tube orifices were intact bilaterally and patent. Posterior and lateral nasal pharyngeal walls were intact and symmetric without ulceration, mass, or granularity. Scope was now advanced distally. Visible oropharynx including lateral walls and tongue base was clear without lesion. Larynx and hypopharynx were examined. Larynx was intact with normal true vocal cord mobility bilaterally. No discrete masses or lesions in any location. Hypopharynx was clear without asymmetry.  Airway was patent. The scope was then withdrawn and removed. She tolerated this well.     ASSESSMENT:   1. Chronic pharyngitis    2. Recurrent streptococcal pharyngitis    3. Acute recurrent streptococcal tonsillitis            PLAN:   Patient has had 2 episodes of strep pharyngitis.  Explained to her that by criteria she does not meet criteria for tonsillectomy.  She had Keflex for the last episode and is still not adequately treated.  I will start her on Augmentin.  I will provide her refill for future recurrence.  If she has had more than 3 episodes and wishes to proceed with tonsillectomy she will call back to schedule      I believe that Ms. Olivas has a good understanding of the issues involved and I answered all of her questions.     DISCLAIMER: This note was prepared with QRGL voice recognition transcription software. Garbled syntax, mangled pronouns, and other bizarre constructions may be attributed to that software system. While efforts were made to correct any mistakes made by this voice recognition program, some errors and/or omissions may remain in the note that were missed when the note was originally created.

## 2024-11-27 ENCOUNTER — PATIENT MESSAGE (OUTPATIENT)
Dept: INTERNAL MEDICINE | Facility: CLINIC | Age: 29
End: 2024-11-27
Payer: COMMERCIAL

## 2024-11-27 DIAGNOSIS — F41.9 ANXIETY: Primary | ICD-10-CM

## 2024-11-27 NOTE — TELEPHONE ENCOUNTER
No care due was identified.  [facility] Embedded Care Due Messages. Reference number: [id number].   11/27/2024 10:55:26 AM CST

## 2024-11-28 RX ORDER — ALPRAZOLAM 0.5 MG/1
0.5 TABLET ORAL NIGHTLY
Qty: 30 TABLET | Refills: 1 | Status: SHIPPED | OUTPATIENT
Start: 2024-11-28

## 2025-01-08 ENCOUNTER — OFFICE VISIT (OUTPATIENT)
Dept: OBSTETRICS AND GYNECOLOGY | Facility: CLINIC | Age: 30
End: 2025-01-08
Attending: OBSTETRICS & GYNECOLOGY
Payer: COMMERCIAL

## 2025-01-08 VITALS — SYSTOLIC BLOOD PRESSURE: 111 MMHG | DIASTOLIC BLOOD PRESSURE: 79 MMHG | BODY MASS INDEX: 33.47 KG/M2 | WEIGHT: 183 LBS

## 2025-01-08 DIAGNOSIS — N89.8 VAGINAL DISCHARGE: Primary | ICD-10-CM

## 2025-01-08 PROCEDURE — 81515 NFCT DS BV&VAGINITIS DNA ALG: CPT | Performed by: OBSTETRICS & GYNECOLOGY

## 2025-01-08 PROCEDURE — 99213 OFFICE O/P EST LOW 20 MIN: CPT | Mod: S$GLB,,, | Performed by: OBSTETRICS & GYNECOLOGY

## 2025-01-08 PROCEDURE — 99999 PR PBB SHADOW E&M-EST. PATIENT-LVL III: CPT | Mod: PBBFAC,,, | Performed by: OBSTETRICS & GYNECOLOGY

## 2025-01-08 PROCEDURE — 3008F BODY MASS INDEX DOCD: CPT | Mod: CPTII,S$GLB,, | Performed by: OBSTETRICS & GYNECOLOGY

## 2025-01-08 PROCEDURE — 1159F MED LIST DOCD IN RCRD: CPT | Mod: CPTII,S$GLB,, | Performed by: OBSTETRICS & GYNECOLOGY

## 2025-01-08 PROCEDURE — 3078F DIAST BP <80 MM HG: CPT | Mod: CPTII,S$GLB,, | Performed by: OBSTETRICS & GYNECOLOGY

## 2025-01-08 PROCEDURE — 3074F SYST BP LT 130 MM HG: CPT | Mod: CPTII,S$GLB,, | Performed by: OBSTETRICS & GYNECOLOGY

## 2025-01-08 NOTE — PROGRESS NOTES
Subjective:       Patient ID: Noreen Olivas is a 29 y.o. female.    Chief Complaint:  Pelvic Pain      History of Present Illness  - patient presents with vaginal dryness and lower pelvic discomfort that started after her period. Is concerned for BV.    Past Medical History:   Diagnosis Date    ADHD (attention deficit hyperactivity disorder)     History of chlamydia infection 2020    Vaccine for human papilloma virus (HPV) types 6, 11, 16, and 18 administered 2007, 2007, 2008    3 of 3 received        History reviewed. No pertinent surgical history.     Family History   Problem Relation Name Age of Onset    No Known Problems Father      No Known Problems Mother      Melanoma Maternal Grandmother      Breast cancer Neg Hx      Colon cancer Neg Hx      Ovarian cancer Neg Hx          Social History     Socioeconomic History    Marital status: Single   Tobacco Use    Smoking status: Never    Smokeless tobacco: Never   Substance and Sexual Activity    Alcohol use: Yes     Comment: socially     Drug use: Never    Sexual activity: Yes     Partners: Male     Birth control/protection: None     Social Drivers of Health     Financial Resource Strain: Medium Risk (11/30/2023)    Overall Financial Resource Strain (CARDIA)     Difficulty of Paying Living Expenses: Somewhat hard   Food Insecurity: No Food Insecurity (11/30/2023)    Hunger Vital Sign     Worried About Running Out of Food in the Last Year: Never true     Ran Out of Food in the Last Year: Never true   Transportation Needs: No Transportation Needs (11/30/2023)    PRAPARE - Transportation     Lack of Transportation (Medical): No     Lack of Transportation (Non-Medical): No   Physical Activity: Insufficiently Active (11/30/2023)    Exercise Vital Sign     Days of Exercise per Week: 1 day     Minutes of Exercise per Session: 10 min   Stress: Stress Concern Present (11/30/2023)    [employer] - Occupational Stress Questionnaire      Feeling of Stress : Very much   Housing Stability: Low Risk  (11/30/2023)    Housing Stability Vital Sign     Unable to Pay for Housing in the Last Year: No     Number of Places Lived in the Last Year: 1     Unstable Housing in the Last Year: No           Objective:     Vitals:    01/08/25 0937   BP: 111/79   Weight: 83 kg (182 lb 15.7 oz)       Physical Exam:   Constitutional: She appears well-developed and well-nourished. She is cooperative. No distress.             Abdominal: Soft. There is no abdominal tenderness.     Genitourinary:    Uterus normal.   There is no rash, tenderness or lesion on the right labia. There is no rash, tenderness or lesion on the left labia. Cervix is normal. Right adnexum displays no mass, no tenderness and no fullness. Left adnexum displays no mass, no tenderness and no fullness. There is vaginal discharge in the vagina.    Genitourinary Comments: Scant clear                 Neurological: She is alert.          A female chaperone was present for exam.    Assessment/ Plan:     Orders Placed This Encounter    Vaginosis Screen by DNA Probe       Noreen was seen today for pelvic pain.    Diagnoses and all orders for this visit:    Vaginal discharge  -     Vaginosis Screen by DNA Probe; Future    - treat prn affirm results.  - patient verbalized understanding and agrees with plan.      Follow up if symptoms worsen or fail to improve.    As of April 1, 2021, the Cures Act has been passed nationally. This new law requires that all doctors progress notes, lab results, pathology reports and radiology reports be released IMMEDIATELY to the patient in the patient portal. That means that the results are released to you at the EXACT same time they are released to me. Therefore, with all of the patients that I have I am not able to reply to each patient exactly when the results come in. So there will be a delay from when you see the results to when I see them and have time to come up with a response  to send you. Also I only see these results when I am on the computer at work. So if the results come in over the weekend or after 5 pm of a work day, I will not see them until the next business day. As you can tell, this is a challenge as a physician to give every patient the quick response they hope for and deserve. So please be patient!   Thanks for your understanding and patience.

## 2025-01-11 ENCOUNTER — PATIENT MESSAGE (OUTPATIENT)
Dept: OBSTETRICS AND GYNECOLOGY | Facility: CLINIC | Age: 30
End: 2025-01-11
Payer: COMMERCIAL

## 2025-01-11 DIAGNOSIS — R14.0 BLOATING: Primary | ICD-10-CM

## 2025-01-11 LAB
BACTERIAL VAGINOSIS DNA: NOT DETECTED
CANDIDA GLABRATA/KRUSEI: NOT DETECTED
CANDIDA RRNA VAG QL PROBE: NOT DETECTED
TRICHOMONAS VAGINALIS: NOT DETECTED

## 2025-01-13 NOTE — TELEPHONE ENCOUNTER
If she's having bloating too, let's get her in for an ultrasound and I can also examine the swollen thing.

## 2025-01-20 ENCOUNTER — TELEPHONE (OUTPATIENT)
Dept: OBSTETRICS AND GYNECOLOGY | Facility: CLINIC | Age: 30
End: 2025-01-20
Payer: COMMERCIAL

## 2025-01-25 ENCOUNTER — TELEPHONE (OUTPATIENT)
Dept: OBSTETRICS AND GYNECOLOGY | Facility: CLINIC | Age: 30
End: 2025-01-25

## 2025-03-14 ENCOUNTER — TELEPHONE (OUTPATIENT)
Dept: OBSTETRICS AND GYNECOLOGY | Facility: CLINIC | Age: 30
End: 2025-03-14
Payer: COMMERCIAL

## 2025-03-14 NOTE — TELEPHONE ENCOUNTER
----- Message from PETEY Salter sent at 3/14/2025  9:52 AM CDT -----  Regarding: FW: Green Priority for pt to be seen within 24 hours  for STD Testing and pt wants to see her own provider    ----- Message -----  From: Lluvia Carpio  Sent: 3/14/2025   9:05 AM CDT  To: Curt BERMUDEZ Staff  Subject: Green Priority for pt to be seen within 24 h#    Type:  Same Day Appointment Request Name of Caller: Green Priority for pt to be seen within 24 hour for STD Testing and pt wants to see her own providerBest Call Back Number: [phone]

## 2025-04-06 ENCOUNTER — PATIENT MESSAGE (OUTPATIENT)
Dept: OTOLARYNGOLOGY | Facility: CLINIC | Age: 30
End: 2025-04-06
Payer: COMMERCIAL

## 2025-04-07 RX ORDER — AMOXICILLIN 500 MG/1
500 TABLET, FILM COATED ORAL 2 TIMES DAILY
Qty: 20 TABLET | Refills: 2 | Status: SHIPPED | OUTPATIENT
Start: 2025-04-07 | End: 2025-04-17

## 2025-05-05 ENCOUNTER — OFFICE VISIT (OUTPATIENT)
Dept: OTOLARYNGOLOGY | Facility: CLINIC | Age: 30
End: 2025-05-05
Payer: COMMERCIAL

## 2025-05-05 VITALS
HEART RATE: 50 BPM | BODY MASS INDEX: 34.8 KG/M2 | WEIGHT: 190.25 LBS | SYSTOLIC BLOOD PRESSURE: 126 MMHG | DIASTOLIC BLOOD PRESSURE: 90 MMHG

## 2025-05-05 DIAGNOSIS — J03.90 TONSILLITIS: Primary | ICD-10-CM

## 2025-05-05 PROCEDURE — 3080F DIAST BP >= 90 MM HG: CPT | Mod: CPTII,S$GLB,, | Performed by: PHYSICIAN ASSISTANT

## 2025-05-05 PROCEDURE — 87070 CULTURE OTHR SPECIMN AEROBIC: CPT | Performed by: PHYSICIAN ASSISTANT

## 2025-05-05 PROCEDURE — 99999 PR PBB SHADOW E&M-EST. PATIENT-LVL III: CPT | Mod: PBBFAC,,, | Performed by: PHYSICIAN ASSISTANT

## 2025-05-05 PROCEDURE — 3074F SYST BP LT 130 MM HG: CPT | Mod: CPTII,S$GLB,, | Performed by: PHYSICIAN ASSISTANT

## 2025-05-05 PROCEDURE — 3008F BODY MASS INDEX DOCD: CPT | Mod: CPTII,S$GLB,, | Performed by: PHYSICIAN ASSISTANT

## 2025-05-05 PROCEDURE — 99213 OFFICE O/P EST LOW 20 MIN: CPT | Mod: S$GLB,,, | Performed by: PHYSICIAN ASSISTANT

## 2025-05-05 PROCEDURE — 1159F MED LIST DOCD IN RCRD: CPT | Mod: CPTII,S$GLB,, | Performed by: PHYSICIAN ASSISTANT

## 2025-05-05 RX ORDER — AMOXICILLIN AND CLAVULANATE POTASSIUM 875; 125 MG/1; MG/1
1 TABLET, FILM COATED ORAL EVERY 12 HOURS
Qty: 20 TABLET | Refills: 0 | Status: SHIPPED | OUTPATIENT
Start: 2025-05-05 | End: 2025-05-05

## 2025-05-05 RX ORDER — AMOXICILLIN AND CLAVULANATE POTASSIUM 875; 125 MG/1; MG/1
1 TABLET, FILM COATED ORAL EVERY 12 HOURS
Qty: 20 TABLET | Refills: 0 | Status: SHIPPED | OUTPATIENT
Start: 2025-05-05 | End: 2025-05-15

## 2025-05-05 NOTE — PROGRESS NOTES
Chief Complaint   Patient presents with    - sore throat, culture swab?         29 y.o. female presents for evaluation of sore throat. She reports recurrent strep pharyngitis. Completed 2 courses of antibiotics in November 2024. In beginning of April - messaged in c/o sore throat and Dr Marcum sent in rx for amoxicillin 500mg twice a day.  She never really got better but today felt worse. Reports sore throat, myalgias, subjective fever, chills. This feels like her prior episodes of strep. No ear pain or lymphadenopathy. No known sick contacts.     No PSHx head/neck  No radiation therapy  Never smoker.       Chart Review  11/13/24 - ENT appt with Dr Marcum, strep pharyngitis was on keflex, changed to augmentin  4/7/25 - messaged saying that she had strep again. Dr Marcum sent in Amoxicillin 500mg BID  Messaged today saying still having sore throat.     Review of Systems     Constitutional: +chills, subjective fever  HENT: Per HPI    Answers submitted by the patient for this visit:  Review of Symptoms Questionnaire  (Submitted on 5/5/2025)  Fatigue (Tiredness)?: Yes  fever: Yes  chills: Yes  None of these : Yes  None of these: Yes  None of these : Yes  constipation: Yes  None of these: Yes  back pain: Yes  neck pain: Yes  None of these : Yes  None of these: Yes  None of these : Yes  None of these: Yes  swollen glands: Yes  Bruises or bleeds easily: Yes  nervous/ anxious: Yes      Past Medical History:   Diagnosis Date    ADHD (attention deficit hyperactivity disorder)     History of chlamydia infection 2020    Vaccine for human papilloma virus (HPV) types 6, 11, 16, and 18 administered 2007, 2007, 2008    3 of 3 received        No past surgical history on file.    family history includes Melanoma in her maternal grandmother; No Known Problems in her father and mother.    Pt  reports that she has never smoked. She has never used smokeless tobacco. She reports current alcohol use. She reports that she does not use  drugs.    Review of patient's allergies indicates:  No Known Allergies     Physical Exam    Vitals:    05/05/25 1428   BP: (!) 126/90   Pulse: (!) 50     Body mass index is 34.8 kg/m².    General: AOx3, NAD  Right Ear: External Auditory Canal WNL,TM w/o masses/lesions/perforations  Left Ear:  External Auditory Canal WNL,TM w/o masses/lesions/perforations  Nose: No gross nasal septal deviation.  Inferior Turbinates WNL bilaterally.  No septal perforation.  No masses/lesions.  Oral Cavity: FOM Soft, no masses palpated.  Oral Tongue mobile.  Hard Palate WNL.  Oropharynx: BOT WNL.  No masses/lesions noted.  +tonsillar hypertrophy, mild erythema, no exudates.  Soft palate without masses.  Midline uvula.  Neck: +L cervical lymphadenopathy  Face: House Brackmann I bilaterally.  Eyes: Normal extra ocular motion bilaterally.      Assessment     1. Tonsillitis          Plan    Problem List Items Addressed This Visit    None  Visit Diagnoses         Tonsillitis    -  Primary    Relevant Orders    Throat culture          Throat culture collected today. This episode feels exactly like prior episodes of strep. Rx for augmentin sent to pharmacy. Follow up with Dr Marcum in 1 month. She is not considering/asking if she qualifies for tonsillectomy.   All questions answered.

## 2025-05-06 NOTE — PROGRESS NOTES
"OBSTETRICS AND GYNECOLOGY    Chief Complaint:  Well Woman Exam     HPI:      Noreen Olivas is a 30 y.o.  who presents today for well woman exam.    LMP: Patient's last menstrual period was 2025. Specifically, patient denies abnormal discharge/odor, pelvic pain, or dysuria/hematuria. Ms. Olivas is currently sexually active with a single male partner. She is currently using no method for contraception. She is agreeable to STD screening today.   Endorsing several months of bleeding before menses starts. The bleeding is described as spotting/light bleeding. This can be up to a week before menses starts. Menses itself about 1 week. Has had some cycles without the spotting. However, majority of cycles have been with this pattern. Has been off and on antibiotics since last . She denies additional issues, problems, or complaints.     Gardasil: Completed      OB History          0    Para   0    Term   0       0    AB   0    Living   0         SAB   0    IAB   0    Ectopic   0    Multiple   0    Live Births   0               ROS:     GENERAL: Feeling well overall.   BREASTS: Denies breast skin changes, lumps or nipple discharge.    URINARY: Denies dysuria, hematuria.    Physical Exam:      PHYSICAL EXAM:  BP (!) 130/94   Ht 5' 2" (1.575 m)   Wt 86.8 kg (191 lb 5.8 oz)   LMP 2025   BMI 35.00 kg/m²   Body mass index is 35 kg/m².     APPEARANCE:  Well nourished, well developed, in no acute distress.  Able to smile appropriately during our encounter. Makes eye contact. Pleasant.  PSYCH: Appropriate mood and affect.  SKIN:   No acne or hirsutism.  CARDIOVASCULAR:  No edema of peripheral extremities. Well perfused throughout.  RESP:  No accessory muscle use to breathe. Speaking comfortably in complete sentences.   BREASTS:  Symmetrical, with no visible skin lesions or scars, no palpable masses. No nipple discharge or peau d'orange bilaterally. No palpable axillary LAD.  ABDOMEN: "  Soft. Nonacute.    PELVIC:  Normal external genitalia without lesions. Normal hair distribution. Adequate perineal body, normal urethral meatus. Vagina moist and well rugated. Without lesions, without discharge. Cervix 3x4cm, nulliparous. Cervix pink, without ectocervical lesions, discharge or tenderness.  No ectropion. No significant cystocele or rectocele.  Bimanual exam shows uterus to be normal size, mobile and nontender.  Adnexa without masses or tenderness.      Female chaperone present.    Assessment/Plan:     Well Woman Exam  -- Counseled patient regarding healthy diet and regular exercise.     -- She denies abuse and feels safe at home.   -- Pap smear:  NILM, HPV(-) 4/2024   -- Contraception: aware of pregnancy possibility  -- STD screening:  ordered    -- Patient of Dr. Elias     Irregular Menses  - Ddx reviewed  - LPS UTD and WNL  - Trial scheduled NSAID course during week before  - Pelvic US  F/u results for next steps/options     Counseling:     Patient was counseled today on current ASCCP pap guidelines, the recommendation for yearly physical exams, and breast self awareness. She is to see her PCP for other health maintenance.     Use of the [employer] Patient Portal discussed and encouraged during today's visit.           As of April 1, 2021, the Cures Act has been passed nationally. This new law requires that all doctors progress notes, lab results, pathology reports and radiology reports be released IMMEDIATELY to the patient in the patient portal. That means that the results are released to you at the EXACT same time they are released to me. Therefore, with all of the patients that I have I am not able to reply to each patient exactly when the results come in. So there will be a delay from when you see the results to when I see them and have time to come up with a response to send you. Also I only see these results when I am on the computer at work. So if the results come in over the weekend or  after 5 pm of a work day, I will not see them until the next business day. As you can tell, this is a challenge as a physician to give every patient the quick response they hope for and deserve. So please be patient!   Thanks for your understanding and patience.

## 2025-05-07 ENCOUNTER — RESULTS FOLLOW-UP (OUTPATIENT)
Dept: OTOLARYNGOLOGY | Facility: CLINIC | Age: 30
End: 2025-05-07

## 2025-05-07 LAB — BACTERIA THROAT CULT: NORMAL

## 2025-05-15 ENCOUNTER — OFFICE VISIT (OUTPATIENT)
Dept: OBSTETRICS AND GYNECOLOGY | Facility: CLINIC | Age: 30
End: 2025-05-15
Payer: COMMERCIAL

## 2025-05-15 ENCOUNTER — LAB VISIT (OUTPATIENT)
Dept: LAB | Facility: HOSPITAL | Age: 30
End: 2025-05-15
Attending: STUDENT IN AN ORGANIZED HEALTH CARE EDUCATION/TRAINING PROGRAM
Payer: COMMERCIAL

## 2025-05-15 VITALS
BODY MASS INDEX: 35.22 KG/M2 | HEIGHT: 62 IN | DIASTOLIC BLOOD PRESSURE: 94 MMHG | WEIGHT: 191.38 LBS | SYSTOLIC BLOOD PRESSURE: 130 MMHG

## 2025-05-15 DIAGNOSIS — Z11.3 SCREEN FOR STD (SEXUALLY TRANSMITTED DISEASE): ICD-10-CM

## 2025-05-15 DIAGNOSIS — Z01.419 ENCOUNTER FOR WELL WOMAN EXAM: Primary | ICD-10-CM

## 2025-05-15 DIAGNOSIS — N92.6 IRREGULAR MENSES: ICD-10-CM

## 2025-05-15 DIAGNOSIS — Z01.419 ENCOUNTER FOR WELL WOMAN EXAM: ICD-10-CM

## 2025-05-15 LAB
HBV SURFACE AG SERPL QL IA: NORMAL
HCV AB SERPL QL IA: NORMAL
HIV 1+2 AB+HIV1 P24 AG SERPL QL IA: NORMAL
T PALLIDUM IGG+IGM SER QL: NORMAL

## 2025-05-15 PROCEDURE — 87340 HEPATITIS B SURFACE AG IA: CPT

## 2025-05-15 PROCEDURE — 99395 PREV VISIT EST AGE 18-39: CPT | Mod: S$GLB,,, | Performed by: STUDENT IN AN ORGANIZED HEALTH CARE EDUCATION/TRAINING PROGRAM

## 2025-05-15 PROCEDURE — 36415 COLL VENOUS BLD VENIPUNCTURE: CPT

## 2025-05-15 PROCEDURE — 86803 HEPATITIS C AB TEST: CPT

## 2025-05-15 PROCEDURE — 3075F SYST BP GE 130 - 139MM HG: CPT | Mod: CPTII,S$GLB,, | Performed by: STUDENT IN AN ORGANIZED HEALTH CARE EDUCATION/TRAINING PROGRAM

## 2025-05-15 PROCEDURE — 3008F BODY MASS INDEX DOCD: CPT | Mod: CPTII,S$GLB,, | Performed by: STUDENT IN AN ORGANIZED HEALTH CARE EDUCATION/TRAINING PROGRAM

## 2025-05-15 PROCEDURE — 86593 SYPHILIS TEST NON-TREP QUANT: CPT

## 2025-05-15 PROCEDURE — 1159F MED LIST DOCD IN RCRD: CPT | Mod: CPTII,S$GLB,, | Performed by: STUDENT IN AN ORGANIZED HEALTH CARE EDUCATION/TRAINING PROGRAM

## 2025-05-15 PROCEDURE — 87389 HIV-1 AG W/HIV-1&-2 AB AG IA: CPT

## 2025-05-15 PROCEDURE — 3080F DIAST BP >= 90 MM HG: CPT | Mod: CPTII,S$GLB,, | Performed by: STUDENT IN AN ORGANIZED HEALTH CARE EDUCATION/TRAINING PROGRAM

## 2025-05-15 PROCEDURE — 99999 PR PBB SHADOW E&M-EST. PATIENT-LVL III: CPT | Mod: PBBFAC,,, | Performed by: STUDENT IN AN ORGANIZED HEALTH CARE EDUCATION/TRAINING PROGRAM

## 2025-05-15 RX ORDER — AMOXICILLIN 500 MG/1
500 TABLET, FILM COATED ORAL 2 TIMES DAILY
COMMUNITY
Start: 2025-04-25

## 2025-05-16 ENCOUNTER — RESULTS FOLLOW-UP (OUTPATIENT)
Dept: OBSTETRICS AND GYNECOLOGY | Facility: CLINIC | Age: 30
End: 2025-05-16

## 2025-05-30 ENCOUNTER — OFFICE VISIT (OUTPATIENT)
Dept: INTERNAL MEDICINE | Facility: CLINIC | Age: 30
End: 2025-05-30
Payer: COMMERCIAL

## 2025-05-30 ENCOUNTER — LAB VISIT (OUTPATIENT)
Dept: LAB | Facility: OTHER | Age: 30
End: 2025-05-30
Attending: FAMILY MEDICINE
Payer: COMMERCIAL

## 2025-05-30 VITALS
HEIGHT: 62 IN | WEIGHT: 189.38 LBS | SYSTOLIC BLOOD PRESSURE: 123 MMHG | BODY MASS INDEX: 34.85 KG/M2 | OXYGEN SATURATION: 97 % | HEART RATE: 89 BPM | DIASTOLIC BLOOD PRESSURE: 81 MMHG

## 2025-05-30 DIAGNOSIS — E66.811 OBESITY (BMI 30.0-34.9): ICD-10-CM

## 2025-05-30 DIAGNOSIS — K59.01 CONSTIPATION BY DELAYED COLONIC TRANSIT: ICD-10-CM

## 2025-05-30 DIAGNOSIS — Z00.00 ROUTINE HEALTH MAINTENANCE: Primary | ICD-10-CM

## 2025-05-30 DIAGNOSIS — Z00.00 ROUTINE HEALTH MAINTENANCE: ICD-10-CM

## 2025-05-30 LAB
ABSOLUTE EOSINOPHIL (OHS): 0.08 K/UL
ABSOLUTE MONOCYTE (OHS): 0.48 K/UL (ref 0.3–1)
ABSOLUTE NEUTROPHIL COUNT (OHS): 3.53 K/UL (ref 1.8–7.7)
ALBUMIN SERPL BCP-MCNC: 4.3 G/DL (ref 3.5–5.2)
ALP SERPL-CCNC: 48 UNIT/L (ref 40–150)
ALT SERPL W/O P-5'-P-CCNC: 34 UNIT/L (ref 10–44)
ANION GAP (OHS): 9 MMOL/L (ref 8–16)
AST SERPL-CCNC: 26 UNIT/L (ref 11–45)
BASOPHILS # BLD AUTO: 0.02 K/UL
BASOPHILS NFR BLD AUTO: 0.3 %
BILIRUB SERPL-MCNC: 0.3 MG/DL (ref 0.1–1)
BUN SERPL-MCNC: 14 MG/DL (ref 6–20)
CALCIUM SERPL-MCNC: 9.4 MG/DL (ref 8.7–10.5)
CHLORIDE SERPL-SCNC: 107 MMOL/L (ref 95–110)
CHOLEST SERPL-MCNC: 195 MG/DL (ref 120–199)
CHOLEST/HDLC SERPL: 2.8 {RATIO} (ref 2–5)
CO2 SERPL-SCNC: 25 MMOL/L (ref 23–29)
CREAT SERPL-MCNC: 0.8 MG/DL (ref 0.5–1.4)
EAG (OHS): 100 MG/DL (ref 68–131)
ERYTHROCYTE [DISTWIDTH] IN BLOOD BY AUTOMATED COUNT: 13.5 % (ref 11.5–14.5)
FSH SERPL-ACNC: 7.61 MIU/ML
GFR SERPLBLD CREATININE-BSD FMLA CKD-EPI: >60 ML/MIN/1.73/M2
GLUCOSE SERPL-MCNC: 85 MG/DL (ref 70–110)
HBA1C MFR BLD: 5.1 % (ref 4–5.6)
HCT VFR BLD AUTO: 36.6 % (ref 37–48.5)
HDLC SERPL-MCNC: 70 MG/DL (ref 40–75)
HDLC SERPL: 35.9 % (ref 20–50)
HGB BLD-MCNC: 11.6 GM/DL (ref 12–16)
IMM GRANULOCYTES # BLD AUTO: 0.01 K/UL (ref 0–0.04)
IMM GRANULOCYTES NFR BLD AUTO: 0.1 % (ref 0–0.5)
LDLC SERPL CALC-MCNC: 110.4 MG/DL (ref 63–159)
LH SERPL-ACNC: 6.4 MIU/ML
LYMPHOCYTES # BLD AUTO: 2.89 K/UL (ref 1–4.8)
MCH RBC QN AUTO: 30.9 PG (ref 27–31)
MCHC RBC AUTO-ENTMCNC: 31.7 G/DL (ref 32–36)
MCV RBC AUTO: 97 FL (ref 82–98)
NONHDLC SERPL-MCNC: 125 MG/DL
NUCLEATED RBC (/100WBC) (OHS): 0 /100 WBC
PLATELET # BLD AUTO: 192 K/UL (ref 150–450)
PMV BLD AUTO: 10.4 FL (ref 9.2–12.9)
POTASSIUM SERPL-SCNC: 4.3 MMOL/L (ref 3.5–5.1)
PROT SERPL-MCNC: 8.2 GM/DL (ref 6–8.4)
RBC # BLD AUTO: 3.76 M/UL (ref 4–5.4)
RELATIVE EOSINOPHIL (OHS): 1.1 %
RELATIVE LYMPHOCYTE (OHS): 41.2 % (ref 18–48)
RELATIVE MONOCYTE (OHS): 6.8 % (ref 4–15)
RELATIVE NEUTROPHIL (OHS): 50.5 % (ref 38–73)
SODIUM SERPL-SCNC: 141 MMOL/L (ref 136–145)
TRIGL SERPL-MCNC: 73 MG/DL (ref 30–150)
TSH SERPL-ACNC: 1.85 UIU/ML (ref 0.4–4)
WBC # BLD AUTO: 7.01 K/UL (ref 3.9–12.7)

## 2025-05-30 PROCEDURE — 83036 HEMOGLOBIN GLYCOSYLATED A1C: CPT

## 2025-05-30 PROCEDURE — 36415 COLL VENOUS BLD VENIPUNCTURE: CPT

## 2025-05-30 PROCEDURE — 80061 LIPID PANEL: CPT

## 2025-05-30 PROCEDURE — 84443 ASSAY THYROID STIM HORMONE: CPT

## 2025-05-30 PROCEDURE — 99999 PR PBB SHADOW E&M-EST. PATIENT-LVL III: CPT | Mod: PBBFAC,,, | Performed by: FAMILY MEDICINE

## 2025-05-30 PROCEDURE — 85025 COMPLETE CBC W/AUTO DIFF WBC: CPT

## 2025-05-30 PROCEDURE — 80053 COMPREHEN METABOLIC PANEL: CPT

## 2025-05-30 PROCEDURE — 83002 ASSAY OF GONADOTROPIN (LH): CPT

## 2025-05-30 PROCEDURE — 83001 ASSAY OF GONADOTROPIN (FSH): CPT

## 2025-05-30 NOTE — PROGRESS NOTES
Subjective:       Patient ID: Noreen Olivas is a 30 y.o. female.    Chief Complaint: Annual Exam    HPI  History of Present Illness    CHIEF COMPLAINT:  Noreen presents today for annual follow-up    GASTROINTESTINAL:  She reports chronic constipation managed with daily Miralax. An X-ray in August showed significant stool accumulation in the colon. She experiences pain during intense exercise, particularly during cycle classes, sometimes leading to stopping mid-class due to severe discomfort and vomiting. Her diet consists of high-protein meals with abundant vegetables, including broccoli with every meal, and baseline carbohydrates such as rice and pasta. She denies fatty food consumption and reports adequate water intake.    GYNECOLOGIC:  She reports irregular menstrual cycles with periods typically starting within a two-week range at the end of the month. She experiences pre-menstrual symptoms before the onset of menstruation.    MEDICAL HISTORY:  She has a history of recurrent strep throat since November requiring multiple courses of amoxicillin. Previous treatment was unsuccessful due to suboptimal antibiotic dosing at urgent care.    MEDICATIONS:  She takes Xanax as needed for sleep difficulties.      ROS:  General: -fever, -chills, -fatigue, -weight gain, -weight loss  Eyes: -vision changes, -redness, -discharge  ENT: -ear pain, -nasal congestion, -sore throat  Cardiovascular: -chest pain, -palpitations, -lower extremity edema  Respiratory: -cough, -shortness of breath  Gastrointestinal: +abdominal pain, -nausea, +vomiting, -diarrhea, +constipation, -blood in stool  Genitourinary: -dysuria, -hematuria, -frequency  Musculoskeletal: -joint pain, -muscle pain  Skin: -rash, -lesion  Neurological: -headache, -dizziness, -numbness, -tingling  Psychiatric: -anxiety, -depression, +sleep difficulty  Female Genitourinary: +absent or irregular periods           All of your core healthy metrics are met.      Social  "History     Social History Narrative    Not on file       Family History   Problem Relation Name Age of Onset    No Known Problems Father      No Known Problems Mother      Melanoma Maternal Grandmother      Breast cancer Neg Hx      Colon cancer Neg Hx      Ovarian cancer Neg Hx       Current Medications[1]    Review of Systems    Objective:   /81 (BP Location: Left arm, Patient Position: Sitting)   Pulse 89   Ht 5' 2" (1.575 m)   Wt 85.9 kg (189 lb 6 oz)   LMP 05/01/2025   SpO2 97%   BMI 34.64 kg/m²      Physical Exam  Constitutional:       General: She is not in acute distress.     Appearance: She is well-developed. She is not diaphoretic.   HENT:      Head: Normocephalic and atraumatic.      Nose: Nose normal.   Eyes:      General:         Right eye: No discharge.         Left eye: No discharge.      Conjunctiva/sclera: Conjunctivae normal.      Pupils: Pupils are equal, round, and reactive to light.   Neck:      Thyroid: No thyromegaly.   Cardiovascular:      Rate and Rhythm: Normal rate and regular rhythm.      Heart sounds: No murmur heard.  Pulmonary:      Effort: Pulmonary effort is normal. No respiratory distress.      Breath sounds: Normal breath sounds.   Abdominal:      General: There is no distension.      Palpations: Abdomen is soft.   Skin:     Findings: No rash.   Neurological:      Mental Status: She is alert and oriented to person, place, and time.   Psychiatric:         Behavior: Behavior normal.         Physical Exam              @resultssec@  Assessment & Plan   Assessment & Plan    IMPRESSION:   Considered history of irregular menstrual cycles and chronic constipation.   Reviewed recent GYN evaluation and pending US for potential polyps.   Assessed current constipation management with Miralax, noting inadequate relief.   Evaluated need for further GI workup, including possible colonoscopy, if conservative measures fail.   Discussed possibility of neuromuscular issues affecting " colon function if conservative treatments are ineffective.   Noted concerns about weight but deemed weight loss injections like Ozempic inappropriate due to potential constipation side effects.    PLAN SUMMARY:   Trial of magnesium citrate (OTC) for colon cleanse, followed by Metamucil for maintenance   Ordered hormone panel (FSH, LH) to investigate menstrual irregularities   Ordered comprehensive labs including thyroid panel, cholesterol, A1C, metabolic panel, CBC   Continue current diet and regular exercise routine, adjusting intensity based on abdominal discomfort   Consider GI referral if symptoms persist   Message provider about progress with bowel movements   Discuss prescription medication options if symptoms relapse after magnesium citrate    CHRONIC CONSTIPATION:   Noreen reports chronic constipation despite adequate hydration and a high protein, high vegetable diet.   Symptoms include painful backup and vomiting during intense exercise, with a slight weight increase since November possibly related to constipation.   Recommend trial of magnesium citrate (OTC) for thorough colon cleanse when convenient to be at home, followed by maintenance with Metamucil and adequate fluid intake.   Noreen to continue current diet and regular exercise routine, adjusting intensity based on abdominal discomfort.   Instructed to message about progress with bowel movements; if magnesium citrate is effective but symptoms relapse, we will discuss prescription medication options.   Will consider GI referral if symptoms persist.    STREPTOCOCCAL PHARYNGITIS:   Noreen was on amoxicillin since November for strep throat, which resolved with a stronger prescription.    IRREGULAR MENSTRUATION:   Noreen reports irregular cycles with a pre-period before the actual period.   Ordered hormone panel (FSH, LH) to investigate menstrual irregularities, considering timing in cycle.   Will evaluate results to assess these irregularities.    INSOMNIA:    Noreen uses Xanax occasionally for sleep issues.    FOLLOW-UP AND LAB TESTS:   Ordered comprehensive labs including thyroid panel, cholesterol, A1C, metabolic panel, CBC, FSH, and LH.         Problem List Items Addressed This Visit          Endocrine    BMI 33.0-33.9,adult       GI    Constipation by delayed colonic transit     Other Visit Diagnoses         Routine health maintenance    -  Primary    Relevant Orders    TSH    Lipid Panel    Hemoglobin A1C    Comprehensive Metabolic Panel    CBC Auto Differential    Follicle Stimulating Hormone    Luteinizing Hormone      Obesity (BMI 30.0-34.9)                  Immunizations Administered on Date of Encounter - 5/30/2025       No immunizations on file.             No follow-ups on file.    This note was generated with the assistance of ambient listening technology. Verbal consent was obtained by the patient and accompanying visitor(s) for the recording of patient appointment to facilitate this note. I attest to having reviewed and edited the generated note for accuracy, though some syntax or spelling errors may persist. Please contact the author of this note for any clarification.      Disclaimer:  This note may have been prepared using voice recognition software, it may have not been extensively proofed, as such there could be errors within the text such as sound alike errors.         [1]   Current Outpatient Medications:     ALPRAZolam (XANAX) 0.5 MG tablet, Take 1 tablet (0.5 mg total) by mouth nightly., Disp: 30 tablet, Rfl: 1

## 2025-06-02 ENCOUNTER — RESULTS FOLLOW-UP (OUTPATIENT)
Dept: INTERNAL MEDICINE | Facility: CLINIC | Age: 30
End: 2025-06-02